# Patient Record
Sex: MALE | Race: WHITE | NOT HISPANIC OR LATINO | Employment: UNEMPLOYED | URBAN - METROPOLITAN AREA
[De-identification: names, ages, dates, MRNs, and addresses within clinical notes are randomized per-mention and may not be internally consistent; named-entity substitution may affect disease eponyms.]

---

## 2022-11-14 ENCOUNTER — DOCUMENTATION (OUTPATIENT)
Dept: SURGERY | Facility: CLINIC | Age: 27
End: 2022-11-14

## 2022-11-14 NOTE — PROGRESS NOTES
CM reached out to client over the phone to confirm his 11/15/22 intake assessment office appointment and client did not answered to CM verification phone call. CM will continue supporting client case management services.   CM sent out a welcome letter to client.

## 2022-11-15 DIAGNOSIS — Z13.1 SCREENING FOR DIABETES MELLITUS: ICD-10-CM

## 2022-11-15 DIAGNOSIS — B20 HIV DISEASE (HCC): Primary | ICD-10-CM

## 2022-11-15 DIAGNOSIS — Z72.89 OTHER PROBLEMS RELATED TO LIFESTYLE: ICD-10-CM

## 2022-11-15 DIAGNOSIS — Z11.1 SCREENING-PULMONARY TB: ICD-10-CM

## 2022-11-15 DIAGNOSIS — Z13.220 ENCOUNTER FOR LIPID SCREENING FOR CARDIOVASCULAR DISEASE: ICD-10-CM

## 2022-11-15 DIAGNOSIS — Z20.2 CONTACT WITH AND (SUSPECTED) EXPOSURE TO INFECTIONS WITH A PREDOMINANTLY SEXUAL MODE OF TRANSMISSION: ICD-10-CM

## 2022-11-15 DIAGNOSIS — Z11.59 ENCOUNTER FOR SCREENING FOR OTHER VIRAL DISEASES: ICD-10-CM

## 2022-11-15 DIAGNOSIS — Z01.84 IMMUNITY STATUS TESTING: ICD-10-CM

## 2022-11-15 DIAGNOSIS — Z11.3 ROUTINE SCREENING FOR STI (SEXUALLY TRANSMITTED INFECTION): ICD-10-CM

## 2022-11-15 DIAGNOSIS — Z13.6 ENCOUNTER FOR LIPID SCREENING FOR CARDIOVASCULAR DISEASE: ICD-10-CM

## 2022-11-16 LAB
ALBUMIN SERPL BCP-MCNC: 3.9 G/DL (ref 3.5–5)
ALP SERPL-CCNC: 60 U/L (ref 46–116)
ALT SERPL W P-5'-P-CCNC: 21 U/L (ref 12–78)
ANION GAP SERPL CALCULATED.3IONS-SCNC: 6 MMOL/L (ref 4–13)
AST SERPL W P-5'-P-CCNC: 13 U/L (ref 5–45)
BACTERIA UR QL AUTO: ABNORMAL /HPF
BASOPHILS # BLD AUTO: 0.01 THOUSANDS/ÂΜL (ref 0–0.1)
BASOPHILS NFR BLD AUTO: 0 % (ref 0–1)
BILIRUB SERPL-MCNC: 1.74 MG/DL (ref 0.2–1)
BILIRUB UR QL STRIP: NEGATIVE
BUN SERPL-MCNC: 14 MG/DL (ref 5–25)
CALCIUM SERPL-MCNC: 9.5 MG/DL (ref 8.3–10.1)
CHLORIDE SERPL-SCNC: 110 MMOL/L (ref 96–108)
CHOLEST SERPL-MCNC: 152 MG/DL
CLARITY UR: ABNORMAL
CO2 SERPL-SCNC: 23 MMOL/L (ref 21–32)
COLOR UR: ABNORMAL
CREAT SERPL-MCNC: 1.06 MG/DL (ref 0.6–1.3)
EOSINOPHIL # BLD AUTO: 0.04 THOUSAND/ÂΜL (ref 0–0.61)
EOSINOPHIL NFR BLD AUTO: 1 % (ref 0–6)
ERYTHROCYTE [DISTWIDTH] IN BLOOD BY AUTOMATED COUNT: 12.8 % (ref 11.6–15.1)
GFR SERPL CREATININE-BSD FRML MDRD: 95 ML/MIN/1.73SQ M
GLUCOSE P FAST SERPL-MCNC: 95 MG/DL (ref 65–99)
GLUCOSE UR STRIP-MCNC: NEGATIVE MG/DL
HCT VFR BLD AUTO: 45.6 % (ref 36.5–49.3)
HDLC SERPL-MCNC: 34 MG/DL
HGB BLD-MCNC: 15.5 G/DL (ref 12–17)
HGB UR QL STRIP.AUTO: NEGATIVE
IMM GRANULOCYTES # BLD AUTO: 0.01 THOUSAND/UL (ref 0–0.2)
IMM GRANULOCYTES NFR BLD AUTO: 0 % (ref 0–2)
KETONES UR STRIP-MCNC: NEGATIVE MG/DL
LDLC SERPL CALC-MCNC: 98 MG/DL (ref 0–100)
LEUKOCYTE ESTERASE UR QL STRIP: NEGATIVE
LYMPHOCYTES # BLD AUTO: 2.06 THOUSANDS/ÂΜL (ref 0.6–4.47)
LYMPHOCYTES NFR BLD AUTO: 42 % (ref 14–44)
MCH RBC QN AUTO: 31.4 PG (ref 26.8–34.3)
MCHC RBC AUTO-ENTMCNC: 34 G/DL (ref 31.4–37.4)
MCV RBC AUTO: 93 FL (ref 82–98)
MONOCYTES # BLD AUTO: 0.4 THOUSAND/ÂΜL (ref 0.17–1.22)
MONOCYTES NFR BLD AUTO: 8 % (ref 4–12)
MUCOUS THREADS UR QL AUTO: ABNORMAL
NEUTROPHILS # BLD AUTO: 2.44 THOUSANDS/ÂΜL (ref 1.85–7.62)
NEUTS SEG NFR BLD AUTO: 49 % (ref 43–75)
NITRITE UR QL STRIP: NEGATIVE
NON-SQ EPI CELLS URNS QL MICRO: ABNORMAL /HPF
NRBC BLD AUTO-RTO: 0 /100 WBCS
PH UR STRIP.AUTO: 5.5 [PH]
PLATELET # BLD AUTO: 235 THOUSANDS/UL (ref 149–390)
PMV BLD AUTO: 10.7 FL (ref 8.9–12.7)
POTASSIUM SERPL-SCNC: 3.9 MMOL/L (ref 3.5–5.3)
PROT SERPL-MCNC: 7.9 G/DL (ref 6.4–8.4)
PROT UR STRIP-MCNC: ABNORMAL MG/DL
RBC # BLD AUTO: 4.93 MILLION/UL (ref 3.88–5.62)
RBC #/AREA URNS AUTO: ABNORMAL /HPF
SODIUM SERPL-SCNC: 139 MMOL/L (ref 135–147)
SP GR UR STRIP.AUTO: 1.02 (ref 1–1.03)
TRIGL SERPL-MCNC: 98 MG/DL
UROBILINOGEN UR STRIP-ACNC: <2 MG/DL
WBC # BLD AUTO: 4.96 THOUSAND/UL (ref 4.31–10.16)
WBC #/AREA URNS AUTO: ABNORMAL /HPF

## 2022-11-16 PROCEDURE — 86803 HEPATITIS C AB TEST: CPT

## 2022-11-16 PROCEDURE — 86708 HEPATITIS A ANTIBODY: CPT

## 2022-11-16 PROCEDURE — 36415 COLL VENOUS BLD VENIPUNCTURE: CPT

## 2022-11-16 PROCEDURE — 86645 CMV ANTIBODY IGM: CPT

## 2022-11-16 PROCEDURE — 80053 COMPREHEN METABOLIC PANEL: CPT

## 2022-11-16 PROCEDURE — 86778 TOXOPLASMA ANTIBODY IGM: CPT

## 2022-11-16 PROCEDURE — 81001 URINALYSIS AUTO W/SCOPE: CPT

## 2022-11-16 PROCEDURE — 87591 N.GONORRHOEAE DNA AMP PROB: CPT

## 2022-11-16 PROCEDURE — 87491 CHLMYD TRACH DNA AMP PROBE: CPT

## 2022-11-16 PROCEDURE — 86706 HEP B SURFACE ANTIBODY: CPT

## 2022-11-16 PROCEDURE — 87340 HEPATITIS B SURFACE AG IA: CPT

## 2022-11-16 PROCEDURE — 81381 HLA I TYPING 1 ALLELE HR: CPT

## 2022-11-16 PROCEDURE — 86592 SYPHILIS TEST NON-TREP QUAL: CPT

## 2022-11-16 PROCEDURE — 86644 CMV ANTIBODY: CPT

## 2022-11-16 PROCEDURE — 83036 HEMOGLOBIN GLYCOSYLATED A1C: CPT

## 2022-11-16 PROCEDURE — 86777 TOXOPLASMA ANTIBODY: CPT

## 2022-11-16 PROCEDURE — 86361 T CELL ABSOLUTE COUNT: CPT

## 2022-11-16 PROCEDURE — 85025 COMPLETE CBC W/AUTO DIFF WBC: CPT

## 2022-11-16 PROCEDURE — 80061 LIPID PANEL: CPT

## 2022-11-16 PROCEDURE — 86480 TB TEST CELL IMMUN MEASURE: CPT

## 2022-11-17 LAB
BASOPHILS # BLD AUTO: 0 X10E3/UL (ref 0–0.2)
BASOPHILS NFR BLD AUTO: 0 %
C TRACH DNA SPEC QL NAA+PROBE: NEGATIVE
CD3+CD4+ CELLS # BLD: 1058 /UL (ref 359–1519)
CD3+CD4+ CELLS NFR BLD: 46 % (ref 30.8–58.5)
CLINICAL COMMENT: NORMAL
CMV IGG SERPL IA-ACNC: 1.4 U/ML (ref 0–0.59)
CMV IGM SERPL IA-ACNC: <30 AU/ML (ref 0–29.9)
EOSINOPHIL # BLD AUTO: 0.1 X10E3/UL (ref 0–0.4)
EOSINOPHIL NFR BLD AUTO: 1 %
ERYTHROCYTE [DISTWIDTH] IN BLOOD BY AUTOMATED COUNT: 13.1 % (ref 11.6–15.4)
EST. AVERAGE GLUCOSE BLD GHB EST-MCNC: 100 MG/DL
HAV AB SER QL IA: REACTIVE
HBA1C MFR BLD: 5.1 %
HBV SURFACE AB SER-ACNC: >1000 MIU/ML
HBV SURFACE AG SER QL: NORMAL
HCT VFR BLD AUTO: 40.4 % (ref 37.5–51)
HCV AB SER QL: NORMAL
HGB BLD-MCNC: 13.9 G/DL (ref 13–17.7)
IMM GRANULOCYTES # BLD: 0 X10E3/UL (ref 0–0.1)
IMM GRANULOCYTES NFR BLD: 0 %
LYMPHOCYTES # BLD AUTO: 2.3 X10E3/UL (ref 0.7–3.1)
LYMPHOCYTES NFR BLD AUTO: 41 %
MCH RBC QN AUTO: 31.4 PG (ref 26.6–33)
MCHC RBC AUTO-ENTMCNC: 34.4 G/DL (ref 31.5–35.7)
MCV RBC AUTO: 91 FL (ref 79–97)
MONOCYTES # BLD AUTO: 0.5 X10E3/UL (ref 0.1–0.9)
MONOCYTES NFR BLD AUTO: 8 %
N GONORRHOEA DNA SPEC QL NAA+PROBE: NEGATIVE
NEUTROPHILS # BLD AUTO: 2.7 X10E3/UL (ref 1.4–7)
NEUTROPHILS NFR BLD AUTO: 50 %
PLATELET # BLD AUTO: 211 X10E3/UL (ref 150–450)
RBC # BLD AUTO: 4.43 X10E6/UL (ref 4.14–5.8)
RPR SER QL: NORMAL
T GONDII IGG SERPL IA-ACNC: <3 IU/ML (ref 0–7.1)
T GONDII IGM SER IA-ACNC: <3 AU/ML (ref 0–7.9)
WBC # BLD AUTO: 5.5 X10E3/UL (ref 3.4–10.8)

## 2022-11-18 LAB
GAMMA INTERFERON BACKGROUND BLD IA-ACNC: 0.06 IU/ML
M TB IFN-G BLD-IMP: NEGATIVE
M TB IFN-G CD4+ BCKGRND COR BLD-ACNC: 0.01 IU/ML
M TB IFN-G CD4+ BCKGRND COR BLD-ACNC: 0.02 IU/ML
MITOGEN IGNF BCKGRD COR BLD-ACNC: >10 IU/ML

## 2022-11-23 LAB — HLA-B*57:01 SPEC QL: NEGATIVE

## 2022-11-28 NOTE — PROGRESS NOTES
APOORVA received a phone call from Mrs. Pittman requesting verification on client current CM services status, which is active.

## 2022-11-29 PROBLEM — B20 CURRENTLY ASYMPTOMATIC HIV INFECTION, WITH HISTORY OF HIV-RELATED ILLNESS (HCC): Status: ACTIVE | Noted: 2022-11-29

## 2022-12-01 ENCOUNTER — DOCUMENTATION (OUTPATIENT)
Dept: SURGERY | Facility: CLINIC | Age: 27
End: 2022-12-01

## 2022-12-01 DIAGNOSIS — B20 CURRENTLY ASYMPTOMATIC HIV INFECTION, WITH HISTORY OF HIV-RELATED ILLNESS (HCC): Primary | ICD-10-CM

## 2022-12-01 PROBLEM — E80.4 GILBERT'S SYNDROME: Status: ACTIVE | Noted: 2022-12-01

## 2022-12-01 NOTE — PROGRESS NOTES
CM reached out to Dr. Kristine Mason over the phone at [phone] to schedule client a general dentist visit, and left a voice message explaining the purposes of the phone call. Dr. Mason is in under [employer] network, and the office is located at [address] [place] [address], near client's home. CM will continue supporting client wellness care plan.

## 2022-12-01 NOTE — PROGRESS NOTES
CM received a notification from Mrs. Garber stating that client was asking for dentist care. CM collaborated client request and ct. Agreed to get a dentist referral as part of his wellness care plan. CM will be supporting client dentist care referral.

## 2022-12-01 NOTE — PROGRESS NOTES
Called and spoke with pt about changing ART to [facility].      · Discussed side effects  · Order sent to [facility]  · Labs in 4 weeks  · Establish care/f/u visit in 6 weeks with ID  · Reviewed labs: elevated direct bili: suspect Gilbert's syndrome and UA: bland  · Discussed with Dr. Daniele Pan  · Pt verbalized understanding with plan of care

## 2023-03-06 PROBLEM — R17 SERUM TOTAL BILIRUBIN ELEVATED: Status: RESOLVED | Noted: 2022-11-29 | Resolved: 2023-03-06

## 2023-03-07 ENCOUNTER — PATIENT OUTREACH (OUTPATIENT)
Dept: SURGERY | Facility: CLINIC | Age: 28
End: 2023-03-07

## 2023-03-07 NOTE — PROGRESS NOTES
The Client e-mailed CM requesting assistance with bills and appointments  CM called and left a voice message asking him to contact CM back

## 2023-03-08 ENCOUNTER — PATIENT OUTREACH (OUTPATIENT)
Dept: SURGERY | Facility: CLINIC | Age: 28
End: 2023-03-08

## 2023-03-09 ENCOUNTER — PATIENT OUTREACH (OUTPATIENT)
Dept: SURGERY | Facility: CLINIC | Age: 28
End: 2023-03-09

## 2023-03-09 NOTE — PROGRESS NOTES
The Client will receive a Wellness and Physical therapy care invitation to his home mailing address to support his wellness care

## 2023-03-10 ENCOUNTER — PATIENT OUTREACH (OUTPATIENT)
Dept: SURGERY | Facility: CLINIC | Age: 28
End: 2023-03-10

## 2023-03-10 NOTE — PROGRESS NOTES
The Client connect with CM to schedule a meeting  The meeting was scheduled for Tuesday, March 14, 2023 @10:30 a m

## 2023-03-10 NOTE — PROGRESS NOTES
The client e-mailed CM requesting assistance with his bills  CM calls him back unsuccessfully because Ct  has not responded  CM left him a voice message

## 2023-03-13 ENCOUNTER — PATIENT OUTREACH (OUTPATIENT)
Dept: SURGERY | Facility: CLINIC | Age: 28
End: 2023-03-13

## 2023-03-13 DIAGNOSIS — B20 CURRENTLY ASYMPTOMATIC HIV INFECTION, WITH HISTORY OF HIV-RELATED ILLNESS (HCC): ICD-10-CM

## 2023-03-13 NOTE — PROGRESS NOTES
The client has reached out to CM via a phone call requesting bills and appointment assistance  CM explained to the client that the medical care bills needed to be brought to the office to assist with his billing and medical appointment issues  The client agreed to meet up with CM at the office at 10:30, client's agreed  CM will be linking Ct back to medical care

## 2023-03-14 ENCOUNTER — PATIENT OUTREACH (OUTPATIENT)
Dept: SURGERY | Facility: CLINIC | Age: 28
End: 2023-03-14

## 2023-03-14 RX ORDER — BICTEGRAVIR SODIUM, EMTRICITABINE, AND TENOFOVIR ALAFENAMIDE FUMARATE 50; 200; 25 MG/1; MG/1; MG/1
1 TABLET ORAL
Qty: 90 TABLET | Refills: 0 | Status: SHIPPED | OUTPATIENT
Start: 2023-03-14

## 2023-03-14 NOTE — PROGRESS NOTES
CM reached out to Ct  to re-schedule him, and the Client said he thought it was for  Wednesday, 3/15/2023  Therefore, the Ct  has been rescheduled for  3/15/23 at 10:30 a m  CM will verify this meeting with Ct  Tomorrow 3/14/23

## 2023-03-15 ENCOUNTER — PATIENT OUTREACH (OUTPATIENT)
Dept: SURGERY | Facility: CLINIC | Age: 28
End: 2023-03-15

## 2023-03-15 NOTE — PROGRESS NOTES
CM connected the client via text message as a reminder of his CM meeting today, and Ct  confirmed it  The Client met with CM and disclosed that he is moving out of [place] to [facility] and would like to receive the same quality care he is and has received from [address]  CM explained to the Client that he would be transitioning and linked to ongoing health care service  The Client also requested assistance with the high-cost co-payments he has been receiving from his Medical Insurance  CM will be contacting [facility]'s billing department because the client RW Authorization, and M D C  Holdings cover him  The Client agreed to continue receiving Medical Care, [address] and ongoing  from [facility]

## 2023-03-17 ENCOUNTER — PATIENT OUTREACH (OUTPATIENT)
Dept: SURGERY | Facility: CLINIC | Age: 28
End: 2023-03-17

## 2023-03-17 NOTE — PROGRESS NOTES
CM contacted the Department of Financial Counselor, Mrs Thierry Caban, at [phone] to discuss Ct 's copayment medical bill  Mrs Thierry Caban explained to CM that due to Ct 's over income, he does not qualify under RW authorization for this bill of February 16, 2023, for $249 62  Mrs Thierry Caban told Cm that she would send a "Hardship" application for Ct  CM will continue supporting the client’s well-being

## 2023-03-20 ENCOUNTER — PATIENT OUTREACH (OUTPATIENT)
Dept: SURGERY | Facility: CLINIC | Age: 28
End: 2023-03-20

## 2023-03-21 ENCOUNTER — PATIENT OUTREACH (OUTPATIENT)
Dept: SURGERY | Facility: CLINIC | Age: 28
End: 2023-03-21

## 2023-03-21 NOTE — PROGRESS NOTES
APOORVA contacted the client to arrange an office meeting to assist him with his medical co-payment bills by completing his HARDSHIP application  The Client agreed to meet with APOORVA on 3/28/2023 at 10:00 a m  The Client has mentioned to  that he is in the process of moving to [place] for a [employer] job   Therefore, the Client will transition his ongoing medical care to [facility]  or [facility]

## 2023-03-21 NOTE — PROGRESS NOTES
The Client has a Co-payment bill of $249 62, and the RW scale approval does not cover ct 's co-payment due to his spouse’s High Income--Ct’s dependent  Therefore, CM will assist Ct  in completing a Hard Ship application

## 2023-03-22 ENCOUNTER — PATIENT OUTREACH (OUTPATIENT)
Dept: SURGERY | Facility: CLINIC | Age: 28
End: 2023-03-22

## 2023-03-22 NOTE — PROGRESS NOTES
APOORVA sent out to the client's home mail address a Reward Program notice to motivate the client to continue proactive with all preventive care plan services as part of his ongoing well-being

## 2023-03-28 ENCOUNTER — PATIENT OUTREACH (OUTPATIENT)
Dept: SURGERY | Facility: CLINIC | Age: 28
End: 2023-03-28

## 2023-03-28 ENCOUNTER — OFFICE VISIT (OUTPATIENT)
Dept: SURGERY | Facility: CLINIC | Age: 28
End: 2023-03-28

## 2023-03-28 ENCOUNTER — APPOINTMENT (OUTPATIENT)
Dept: LAB | Facility: CLINIC | Age: 28
End: 2023-03-28

## 2023-03-28 VITALS
HEART RATE: 77 BPM | WEIGHT: 220.6 LBS | OXYGEN SATURATION: 99 % | BODY MASS INDEX: 28.31 KG/M2 | HEIGHT: 74 IN | TEMPERATURE: 97.2 F | RESPIRATION RATE: 17 BRPM | DIASTOLIC BLOOD PRESSURE: 63 MMHG | SYSTOLIC BLOOD PRESSURE: 135 MMHG

## 2023-03-28 DIAGNOSIS — Z23 NEED FOR PNEUMOCOCCAL VACCINATION: ICD-10-CM

## 2023-03-28 DIAGNOSIS — B20 HIV DISEASE (HCC): ICD-10-CM

## 2023-03-28 DIAGNOSIS — Z23 NEED FOR TDAP VACCINATION: ICD-10-CM

## 2023-03-28 DIAGNOSIS — Z02.4 DRIVER'S PERMIT PE (PHYSICAL EXAMINATION): ICD-10-CM

## 2023-03-28 DIAGNOSIS — E80.4 GILBERT'S SYNDROME: ICD-10-CM

## 2023-03-28 DIAGNOSIS — R80.9 PROTEINURIA, UNSPECIFIED TYPE: ICD-10-CM

## 2023-03-28 DIAGNOSIS — B20 CURRENTLY ASYMPTOMATIC HIV INFECTION, WITH HISTORY OF HIV-RELATED ILLNESS (HCC): Primary | ICD-10-CM

## 2023-03-28 LAB
ALBUMIN SERPL BCP-MCNC: 4.1 G/DL (ref 3.5–5)
ALP SERPL-CCNC: 49 U/L (ref 46–116)
ALT SERPL W P-5'-P-CCNC: 21 U/L (ref 12–78)
ANION GAP SERPL CALCULATED.3IONS-SCNC: 3 MMOL/L (ref 4–13)
AST SERPL W P-5'-P-CCNC: 16 U/L (ref 5–45)
BASOPHILS # BLD AUTO: 0.01 THOUSANDS/ÂΜL (ref 0–0.1)
BASOPHILS NFR BLD AUTO: 0 % (ref 0–1)
BILIRUB SERPL-MCNC: 1.43 MG/DL (ref 0.2–1)
BUN SERPL-MCNC: 13 MG/DL (ref 5–25)
CALCIUM SERPL-MCNC: 9.2 MG/DL (ref 8.3–10.1)
CHLORIDE SERPL-SCNC: 108 MMOL/L (ref 96–108)
CO2 SERPL-SCNC: 26 MMOL/L (ref 21–32)
CREAT SERPL-MCNC: 0.94 MG/DL (ref 0.6–1.3)
EOSINOPHIL # BLD AUTO: 0.07 THOUSAND/ÂΜL (ref 0–0.61)
EOSINOPHIL NFR BLD AUTO: 1 % (ref 0–6)
ERYTHROCYTE [DISTWIDTH] IN BLOOD BY AUTOMATED COUNT: 12.2 % (ref 11.6–15.1)
GFR SERPL CREATININE-BSD FRML MDRD: 110 ML/MIN/1.73SQ M
GLUCOSE P FAST SERPL-MCNC: 90 MG/DL (ref 65–99)
HCT VFR BLD AUTO: 47.7 % (ref 36.5–49.3)
HGB BLD-MCNC: 15.8 G/DL (ref 12–17)
IMM GRANULOCYTES # BLD AUTO: 0.01 THOUSAND/UL (ref 0–0.2)
IMM GRANULOCYTES NFR BLD AUTO: 0 % (ref 0–2)
LYMPHOCYTES # BLD AUTO: 1.74 THOUSANDS/ÂΜL (ref 0.6–4.47)
LYMPHOCYTES NFR BLD AUTO: 36 % (ref 14–44)
MCH RBC QN AUTO: 30.4 PG (ref 26.8–34.3)
MCHC RBC AUTO-ENTMCNC: 33.1 G/DL (ref 31.4–37.4)
MCV RBC AUTO: 92 FL (ref 82–98)
MONOCYTES # BLD AUTO: 0.35 THOUSAND/ÂΜL (ref 0.17–1.22)
MONOCYTES NFR BLD AUTO: 7 % (ref 4–12)
NEUTROPHILS # BLD AUTO: 2.69 THOUSANDS/ÂΜL (ref 1.85–7.62)
NEUTS SEG NFR BLD AUTO: 56 % (ref 43–75)
NRBC BLD AUTO-RTO: 0 /100 WBCS
PLATELET # BLD AUTO: 223 THOUSANDS/UL (ref 149–390)
PMV BLD AUTO: 10.7 FL (ref 8.9–12.7)
POTASSIUM SERPL-SCNC: 3.8 MMOL/L (ref 3.5–5.3)
PROT SERPL-MCNC: 7.6 G/DL (ref 6.4–8.4)
RBC # BLD AUTO: 5.2 MILLION/UL (ref 3.88–5.62)
SODIUM SERPL-SCNC: 137 MMOL/L (ref 135–147)
WBC # BLD AUTO: 4.87 THOUSAND/UL (ref 4.31–10.16)

## 2023-03-28 NOTE — ASSESSMENT & PLAN NOTE
Gained 4 lb since last visit     BMI: 28 32; WT: 220 lb  · Continue to work out at gym  · Recommend portion control

## 2023-03-28 NOTE — ASSESSMENT & PLAN NOTE
Doing well on Biktarvy with an undetectable VL  Pt reports 100% medication compliance on HAART  · Stressed the importance of 100% medication adherence    HIV Counseling:    Viral Load: < 20    CD4 Count: 0000      Denies side effects  Stressed the importance of adherence  Continue follow up in the ID clinic with Dr Delon Dutton or Dr Bee Thorne  Reviewed the most recent labs, including the CD4 and viral load  Discussed the risks and benefits of treatment options, instructions for management, importance of treatment adherence, and reduction of risk factors  Educated on possible medication side effects  Counseled on routes of HIV transmission, including the risk of  infection  Emphasized that viral suppression is the best method to prevent HIV transmission  At this time the pt denies the need for HIV testing of anyone in their life  Total encounter time was greater than 35 minutes  Greater than 20 minutes were spent on counseling and patient education  Pt voices understanding and agreement with treatment plan

## 2023-03-28 NOTE — ASSESSMENT & PLAN NOTE
Feeling well  Last UA bland;no protein  BP well controlled    · Continue to monitor BP  · Continue to monitor UA

## 2023-03-28 NOTE — PATIENT INSTRUCTIONS
Heart Healthy Diet   AMBULATORY CARE:   A heart healthy diet  is an eating plan low in unhealthy fats and sodium (salt)  The plan is high in healthy fats and fiber  A heart healthy diet helps improve your cholesterol levels and lowers your risk for heart disease and stroke  A dietitian will teach you how to read and understand food labels  Heart healthy diet guidelines to follow:   • Choose foods that contain healthy fats:      ? Unsaturated fats  include monounsaturated and polyunsaturated fats  Unsaturated fat is found in foods such as soybean, canola, olive, corn, and safflower oils  It is also found in soft tub margarine that is made with liquid vegetable oil  ? Omega-3 fat  is found in certain fish, such as salmon, tuna, and trout, and in walnuts and flaxseed  Eat fish high in omega-3 fats at least 2 times a week  • Limit or do not have unhealthy fats:      ? Cholesterol  is found in animal foods, such as eggs and lobster, and in dairy products made from whole milk  Limit cholesterol to less than 200 mg each day  ? Saturated fat  is found in meats, such as varghese and hamburger  It is also found in chicken or turkey skin, whole milk, and butter  Limit saturated fat to less than 7% of your total daily calories  ? Trans fat  is found in packaged foods, such as potato chips and cookies  It is also in hard margarine, some fried foods, and shortening  Do not eat foods that contain trans fats  • Get 20 to 30 grams of fiber each day  Fruits, vegetables, whole-grain foods, and legumes (cooked beans) are good sources of fiber  • Limit sodium as directed  You may be told to limit sodium, such as to 2,000 mg or less each day  Choose low-sodium or no-salt-added foods  Add little or no salt to food you prepare  Use herbs and spices in place of salt         Include the following in your heart healthy plan:  Ask your dietitian or healthcare provider how many servings to have each day from the following food groups:  • Grains:      ? Whole-wheat breads, cereals, and pastas, and brown rice    ? Low-fat, low-sodium crackers and chips    • Vegetables:      ? Broccoli, green beans, green peas, and spinach    ? Collards, kale, and lima beans    ? Carrots, sweet potatoes, tomatoes, and peppers    ? Canned vegetables with no salt added    • Fruits:      ? Bananas, peaches, pears, and pineapple    ? Grapes, raisins, and dates    ? Oranges, tangerines, grapefruit, orange juice, and grapefruit juice    ? Apricots, mangoes, melons, and papaya    ? Raspberries and strawberries    ? Canned fruit with no added sugar    • Low-fat dairy:      ? Nonfat (skim) milk, 1% milk, and low-fat almond, cashew, or soy milks fortified with calcium    ? Low-fat cheese, regular or frozen yogurt, and cottage cheese    • Meats and proteins:      ? Lean cuts of beef and pork (loin, leg, round), skinless chicken and turkey    ? Legumes, soy products, egg whites, or nuts    Limit or do not include the following in your heart healthy plan:   • Foods and liquids that contain unhealthy fats and oils:      ? Whole or 2% milk, cream cheese, sour cream, or cheese    ? High-fat cuts of beef (T-bone steaks, ribs), chicken or turkey with skin, and organ meats such as liver    ? Butter, stick margarine, shortening, and cooking oils such as coconut or palm oil    • Foods and liquids high in sodium:      ? Packaged foods, such as frozen dinners, cookies, macaroni and cheese, and cereals with more than 300 mg of sodium per serving    ? Vegetables with added sodium, such as instant potatoes, vegetables with added sauces, or regular canned vegetables    ? Cured or smoked meats, such as hot dogs, varghese, and sausage    ? High-sodium ketchup, barbecue sauce, salad dressing, pickles, olives, soy sauce, or miso    • Foods and liquids high in sugar:      ? Candy, cake, cookies, pies, or doughnuts    ?  Soft drinks (soda), sports drinks, or sweetened tea    ? Canned or dry mixes for cakes, soups, sauces, or gravies    Other healthy heart guidelines:   • Do not smoke  Nicotine and other chemicals in cigarettes and cigars can cause lung and heart damage  Ask your healthcare provider for information if you currently smoke and need help to quit  E-cigarettes or smokeless tobacco still contain nicotine  Talk to your provider before you use these products  • Limit or do not drink alcohol as directed  Alcohol can damage your heart and raise your blood pressure  Your healthcare provider may give you specific daily and weekly limits  The general recommended limit is 1 drink a day for women 21 or older and for men 72 or older  Do not have more than 3 drinks within 24 hours or 7 within a week  The recommended limit is 2 drinks a day for men 24to 59years of age  Do not have more than 4 drinks within 24 hours or 14 within a week  A drink of alcohol is 12 ounces of beer, 5 ounces of wine, or 1½ ounces of liquor  • Maintain a healthy weight  Extra body weight makes your heart work harder  Ask your provider what a healthy weight is for you  He or she can help you create a safe weight loss plan, if needed  • Exercise regularly  Exercise can help you maintain a healthy weight and improve your blood pressure and cholesterol levels  Regular exercise can also decrease your risk for heart problems  Ask your provider about the best exercise plan for you  Do not start an exercise program without asking your provider  Follow up with your doctor or cardiologist as directed:  Write down your questions so you remember to ask them during your visits  © Copyright [employer] 2022 Information is for End User's use only and may not be sold, redistributed or otherwise used for commercial purposes  The above information is an  only  It is not intended as medical advice for individual conditions or treatments   Talk to your doctor, nurse or pharmacist before following any medical regimen to see if it is safe and effective for you

## 2023-03-28 NOTE — PROGRESS NOTES
Assessment/Plan:    Currently asymptomatic HIV infection, with history of HIV-related illness ([facility] )  Doing well on Biktarvy with an undetectable VL  Pt reports 100% medication compliance on HAART  · Stressed the importance of 100% medication adherence    HIV Counseling:    Viral Load: < 20    CD4 Count: 6334      Denies side effects  Stressed the importance of adherence  Continue follow up in the ID clinic with Dr Whitley Crocker or Dr Janell Mcdonough  Reviewed the most recent labs, including the CD4 and viral load  Discussed the risks and benefits of treatment options, instructions for management, importance of treatment adherence, and reduction of risk factors  Educated on possible medication side effects  Counseled on routes of HIV transmission, including the risk of  infection  Emphasized that viral suppression is the best method to prevent HIV transmission  At this time the pt denies the need for HIV testing of anyone in their life  Total encounter time was greater than 35 minutes  Greater than 20 minutes were spent on counseling and patient education  Pt voices understanding and agreement with treatment plan  Gilbert's syndrome  Feeling well  T  BIli: 1 81   Known hx of Gilbert's syndrome  · No need for further workup  · Continue to monitor CMP    Proteinuria  Feeling well  Last UA bland;no protein  BP well controlled  · Continue to monitor BP  · Continue to monitor UA     BMI 28 0-28 9,adult  Gained 4 lb since last visit     BMI: 28 32; WT: 220 lb  · Continue to work out at gym  · Recommend portion control       Diagnoses and all orders for this visit:    Currently asymptomatic HIV infection, with history of HIV-related illness ([facility] )    Need for Tdap vaccination  -     TDAP VACCINE GREATER THAN OR EQUAL TO 6YO IM    Need for pneumococcal vaccination  -     Pneumococcal Conjugate Vaccine 20-valent (Pcv20)    Gilbert's syndrome    Proteinuria, unspecified type    's permit PE (physical "examination)    BMI 28 0-28 9,adult        Lab Results   Component Value Date    K 3 8 01/03/2023     01/03/2023    CO2 29 01/03/2023    BUN 11 01/03/2023    CREATININE 0 99 01/03/2023    GLUF 93 01/03/2023    CALCIUM 9 3 01/03/2023    AST 12 01/03/2023    ALT 20 01/03/2023    ALKPHOS 55 01/03/2023    EGFR 103 01/03/2023     Lab Results   Component Value Date    WBC 4 96 01/03/2023    HGB 15 4 01/03/2023    HCT 47 0 01/03/2023    MCV 95 01/03/2023     01/03/2023          Subjective:      Patient ID: Valencia Parsons  is a 32 y o  male  HPI  Pt presents for 3 month follow up for [employer] and management of HIV  Pt was changed to [place] after moving to [place] from [facility] and tolerating well  Pt is doing great  Pt got his ID and has been traveling the [address] [facility]  Pt is planning on getting a job  Pt is applying for [employer] permit and needs a physical          Pt reports 3 days of HA's last week while working on the computer and on the [employer] and that relieved his HA  This is the medication OTC medication he would take in [facility] for pain relief  Pt is not sure if it was due to working on the computer but no further HA's  Pt does not endorse any fever, chills, nausea, vomiting, diarrhea, or night sweats  The following portions of the patient's history were reviewed and updated as appropriate: allergies, current medications, past family history, past medical history, past social history and problem list     Review of Systems   Constitutional: Negative  HENT: Negative  Respiratory: Negative  Cardiovascular: Negative  Gastrointestinal: Negative  Genitourinary: Negative  Musculoskeletal: Negative  Neurological: Negative  Psychiatric/Behavioral: Negative            Objective:      /63 (BP Location: Right arm, Patient Position: Sitting, Cuff Size: Large)   Pulse 77   Temp (!) 97 2 °F (36 2 °C) (Tympanic)   Resp 17   Ht 6' 2\" (1 88 m)   Wt 100 kg (220 lb 9 6 " oz)   SpO2 99%   BMI 28 32 kg/m²          Physical Exam  Vitals and nursing note reviewed  Constitutional:       General: He is not in acute distress  Appearance: Normal appearance  He is not ill-appearing  HENT:      Head: Normocephalic  Right Ear: Tympanic membrane normal       Left Ear: Tympanic membrane normal       Nose: Nose normal       Mouth/Throat:      Mouth: Mucous membranes are moist       Pharynx: Oropharynx is clear  Eyes:      Extraocular Movements: Extraocular movements intact  Pupils: Pupils are equal, round, and reactive to light  Cardiovascular:      Rate and Rhythm: Normal rate and regular rhythm  Chest Wall: PMI is not displaced  Pulses: Normal pulses  Heart sounds: Normal heart sounds  Pulmonary:      Effort: Pulmonary effort is normal       Breath sounds: Normal breath sounds  Abdominal:      General: Bowel sounds are normal       Palpations: Abdomen is soft  Musculoskeletal:         General: Normal range of motion  Lymphadenopathy:      Cervical: No cervical adenopathy  Skin:     General: Skin is warm and dry  Capillary Refill: Capillary refill takes less than 2 seconds  Neurological:      Mental Status: He is alert and oriented to person, place, and time  Psychiatric:         Mood and Affect: Mood normal          Behavior: Behavior normal          Thought Content: Thought content normal          Judgment: Judgment normal        BMI Counseling: Body mass index is 28 32 kg/m²  The BMI is above normal  Nutrition recommendations include reducing portion sizes, decreasing overall calorie intake, reducing fast food intake, consuming healthier snacks, moderation in carbohydrate intake, increasing intake of lean protein, reducing intake of saturated fat and trans fat and reducing intake of cholesterol  Exercise recommendations include moderate aerobic physical activity for 150 minutes/week, exercising 3-5 times per week and joining a gym

## 2023-03-28 NOTE — ASSESSMENT & PLAN NOTE
Feeling well  T  BIli: 1 81   Known hx of Gilbert's syndrome  · No need for further workup    · Continue to monitor CMP

## 2023-03-29 LAB
BASOPHILS # BLD AUTO: 0 X10E3/UL (ref 0–0.2)
BASOPHILS NFR BLD AUTO: 0 %
CD3+CD4+ CELLS # BLD: 670 /UL (ref 359–1519)
CD3+CD4+ CELLS NFR BLD: 39.4 % (ref 30.8–58.5)
EOSINOPHIL # BLD AUTO: 0.1 X10E3/UL (ref 0–0.4)
EOSINOPHIL NFR BLD AUTO: 1 %
ERYTHROCYTE [DISTWIDTH] IN BLOOD BY AUTOMATED COUNT: 12.2 % (ref 11.6–15.4)
HCT VFR BLD AUTO: 46.5 % (ref 37.5–51)
HGB BLD-MCNC: 15.9 G/DL (ref 13–17.7)
IMM GRANULOCYTES # BLD: 0 X10E3/UL (ref 0–0.1)
IMM GRANULOCYTES NFR BLD: 0 %
LYMPHOCYTES # BLD AUTO: 1.7 X10E3/UL (ref 0.7–3.1)
LYMPHOCYTES NFR BLD AUTO: 36 %
MCH RBC QN AUTO: 30.6 PG (ref 26.6–33)
MCHC RBC AUTO-ENTMCNC: 34.2 G/DL (ref 31.5–35.7)
MCV RBC AUTO: 90 FL (ref 79–97)
MONOCYTES # BLD AUTO: 0.3 X10E3/UL (ref 0.1–0.9)
MONOCYTES NFR BLD AUTO: 7 %
NEUTROPHILS # BLD AUTO: 2.5 X10E3/UL (ref 1.4–7)
NEUTROPHILS NFR BLD AUTO: 56 %
PLATELET # BLD AUTO: 222 X10E3/UL (ref 150–450)
RBC # BLD AUTO: 5.19 X10E6/UL (ref 4.14–5.8)
WBC # BLD AUTO: 4.6 X10E3/UL (ref 3.4–10.8)

## 2023-03-29 NOTE — PROGRESS NOTES
CM met with Ct to discuss his CA-sb-dcwddad  CM informed Ct about his input to his co-payment due to the Client's Spouse’s High Income  The Client has acknowledged his co-payment but refuses to pay it  The Client also disclosed that he is moving to [place] and denies being at school or having any other source of income  CM informed Ct ’s ‘HARDSHIP’ application will be pending until he submits his spouse’s recent three last month’s pay stubs, ID, household bills or the apartment lease, and three rent payment receipts  The Client agreed to bring it and leave it to the [facility] desk team to be provided to CM  CM will continue supporting Ct 's well-being

## 2023-03-30 LAB
HIV1 RNA # SERPL NAA+PROBE: <20 COPIES/ML
HIV1 RNA SERPL NAA+PROBE-LOG#: NORMAL LOG10COPY/ML

## 2023-04-03 ENCOUNTER — PATIENT OUTREACH (OUTPATIENT)
Dept: SURGERY | Facility: CLINIC | Age: 28
End: 2023-04-03

## 2023-04-03 NOTE — PROGRESS NOTES
Cm contacted Ct to remind him his documentation requires to complete the HARDSHIP application  The Ct  Disclosed that he is reuniting the requested documentation  Cm will continue to support Ct 's well-being

## 2023-04-04 ENCOUNTER — PATIENT OUTREACH (OUTPATIENT)
Dept: SURGERY | Facility: CLINIC | Age: 28
End: 2023-04-04

## 2023-04-04 NOTE — PROGRESS NOTES
Cm receives Ct 's lab work from the clinician  The Client CD4 shows that Ct is still undetectable  Cm will continue to support Ct 's well-being

## 2023-04-05 ENCOUNTER — PATIENT OUTREACH (OUTPATIENT)
Dept: SURGERY | Facility: CLINIC | Age: 28
End: 2023-04-05

## 2023-04-05 ENCOUNTER — OFFICE VISIT (OUTPATIENT)
Dept: SURGERY | Facility: CLINIC | Age: 28
End: 2023-04-05

## 2023-04-05 VITALS
BODY MASS INDEX: 28.6 KG/M2 | SYSTOLIC BLOOD PRESSURE: 140 MMHG | OXYGEN SATURATION: 98 % | HEIGHT: 74 IN | WEIGHT: 222.87 LBS | DIASTOLIC BLOOD PRESSURE: 74 MMHG | TEMPERATURE: 97.1 F | HEART RATE: 83 BPM

## 2023-04-05 DIAGNOSIS — Z23 NEED FOR MENACTRA VACCINATION: ICD-10-CM

## 2023-04-05 DIAGNOSIS — R63.5 WEIGHT GAIN: ICD-10-CM

## 2023-04-05 DIAGNOSIS — B20 HIV DISEASE (HCC): Primary | ICD-10-CM

## 2023-04-05 DIAGNOSIS — E80.4 GILBERT'S SYNDROME: ICD-10-CM

## 2023-04-05 NOTE — PROGRESS NOTES
Cm contacted and revised Ct's medical care and assessment  Additionally, cm requested from Ct the documentation required to complete the 'HARPSHIP' application to assist Ct 's RW co-payment  Cm explained to Ct that his RW co-payment (CAP) goes according to his spouse’s income which is too high ($110,000 00) according to the Table of Poverty Guidelines  Ct understood and has requested to continue with the HS  Application  The HS  application is postponed due to Ct's incomplete documentation  Cm also reminded Ct of the Epic assessment due by 5/15/2023  Cm will continue to support Ct 's well-being

## 2023-04-05 NOTE — PROGRESS NOTES
"Progress Note - Infectious Disease   Almita Sandoval  32 y o  male MRN: [id number]  Unit/Bed#:  Encounter: [id number]      Impression/Plan:  1  HIV  Doing well on Biktarvy with an undetectable viral load and a CD4 count of greater than 600  Continue ART, recheck labs in 2 months and follow-up in 3 months  Stressed adherence  2   Gabrielle Alar syndrome  Monitor LFTs  Clinically inconsequential     3   Slightly elevated blood pressure  With no clinical diagnosis of hypertension  Will monitor for now  4   Weight gain  Explained to patient that there is an association between [facility] and weight gain and he will need to watch his weight carefully with improved diet and increased exercise  Patient was provided medication, adherence and prevention education    Subjective:  Routine follow-up for HIV  Patient claims 100% adherence with Biktarvy     Patient denies any notable side effects  Overall the feeling well  The patient denies any fever chills or sweats, denies any nausea vomiting or diarrhea, denies any cough or shortness of breath  ROS:  A complete review of systems is negative other than that noted above in the subjective    Followup portions patient history reviewed and updated as: Allergies, current medications, past medical history, past social history, past surgical history, and the problem list    Objective:  Vitals:  Vitals:    04/05/23 1713   BP: 140/74   BP Location: Right arm   Patient Position: Sitting   Cuff Size: Standard   Pulse: 83   Temp: (!) 97 1 °F (36 2 °C)   TempSrc: Tympanic   SpO2: 98%   Weight: 101 kg (222 lb 13 9 oz)   Height: 6' 2\" (1 88 m)       Physical Exam:   General Appearance:  Alert, interactive, appearing well,  nontoxic, no acute distress  Neck:   Supple without lymphadenopathy, no thyromegaly or masses   Throat: Oropharynx moist without lesions      Lungs:   Clear to auscultation bilaterally; no wheezes, rhonchi or rales; respirations unlabored   Heart:  " RRR; no murmur, rub or gallop   Abdomen:   Soft, non-tender, non-distended, positive bowel sounds  Extremities: No clubbing, cyanosis or edema   Skin: No new rashes or lesions  No draining wounds noted  Labs, Imaging, & Other studies:   All pertinent labs and imaging studies were personally reviewed    Lab Results   Component Value Date    K 3 8 03/28/2023     03/28/2023    CO2 26 03/28/2023    BUN 13 03/28/2023    CREATININE 0 94 03/28/2023    GLUF 90 03/28/2023    CALCIUM 9 2 03/28/2023    AST 16 03/28/2023    ALT 21 03/28/2023    ALKPHOS 49 03/28/2023    EGFR 110 03/28/2023     Lab Results   Component Value Date    WBC 4 87 03/28/2023    WBC 4 6 03/28/2023    HGB 15 8 03/28/2023    HGB 15 9 03/28/2023    HCT 47 7 03/28/2023    HCT 46 5 03/28/2023    MCV 92 03/28/2023    MCV 90 03/28/2023     03/28/2023     03/28/2023     Lab Results   Component Value Date    HEPCAB Non-reactive 11/16/2022     Lab Results   Component Value Date    HAV Reactive (A) 11/16/2022    HEPCAB Non-reactive 11/16/2022     Lab Results   Component Value Date    RPR Non-Reactive 11/16/2022     CD4 ABS   Date/Time Value Ref Range Status   03/28/2023 10:13  359 - 1519 /uL Final     HIV-1 RNA by PCR, Qn   Date/Time Value Ref Range Status   03/28/2023 10:13 AM <20 copies/mL Final     Comment:     HIV-1 RNA not detected  The reportable range for this assay is 20 to 10,000,000  copies HIV-1 RNA/mL             Current Outpatient Medications:   •  Biktarvy -25 MG tablet, TAKE 1 TABLET BY MOUTH DAILY WITH BREAKFAST, Disp: 90 tablet, Rfl: 0

## 2023-04-06 ENCOUNTER — PATIENT OUTREACH (OUTPATIENT)
Dept: SURGERY | Facility: CLINIC | Age: 28
End: 2023-04-06

## 2023-04-06 NOTE — PROGRESS NOTES
Cm received Ct's required documentation and could complete the HARDSHIP application to assist the client with his RW CAP payments  Ct is aware of possible denial due to his spouse’s High Income  The Ct  Understood  Cm applied and submitted the application to the [facility]  Cm will continue to support Ct's well-being

## 2023-04-24 ENCOUNTER — PATIENT OUTREACH (OUTPATIENT)
Dept: SURGERY | Facility: CLINIC | Age: 28
End: 2023-04-24

## 2023-04-25 NOTE — PROGRESS NOTES
Ct  Contacted Florentino requesting assistance with his [address]  Cm recently completed a hardship application for the client  Cm is still waiting to receive the outcome of the application  Cm asked for the hospital bill documentation from the client, and the client agreed to bring it

## 2023-04-27 ENCOUNTER — PATIENT OUTREACH (OUTPATIENT)
Dept: SURGERY | Facility: CLINIC | Age: 28
End: 2023-04-27

## 2023-04-27 NOTE — PROGRESS NOTES
The Client contacted Cm to discuss his hospital treatment bills  Cm suggested the client provide the hospital services payments to review it and better assist him with a possible RW authorization or another HARDSHIP application  The Client's  has a higher income, and the client is his dependent  Cm will follow up with previous HARDSHIP application status

## 2023-04-28 ENCOUNTER — PATIENT OUTREACH (OUTPATIENT)
Dept: SURGERY | Facility: CLINIC | Age: 28
End: 2023-04-28

## 2023-04-28 NOTE — PROGRESS NOTES
The Client has been invited to participate in the Functional Fitness program held every Thursday from 1 pm to 2 pm by [employer]

## 2023-05-01 ENCOUNTER — PATIENT OUTREACH (OUTPATIENT)
Dept: SURGERY | Facility: CLINIC | Age: 28
End: 2023-05-01

## 2023-05-01 NOTE — PROGRESS NOTES
Florentino e-mailed the Financial Counselor, Mrs Romeo Tanner, to determine the client's HARDSHIP application status  The Client is receiving hospital bills that, according to him, he cannot afford

## 2023-05-02 ENCOUNTER — PATIENT OUTREACH (OUTPATIENT)
Dept: SURGERY | Facility: CLINIC | Age: 28
End: 2023-05-02

## 2023-05-02 NOTE — PROGRESS NOTES
The Client will receive educational HIV preventive medications to support the client's lifestyle management

## 2023-05-03 ENCOUNTER — PATIENT OUTREACH (OUTPATIENT)
Dept: SURGERY | Facility: CLINIC | Age: 28
End: 2023-05-03

## 2023-05-03 NOTE — PROGRESS NOTES
Cm e-mailed the financial Counselor team to find out about Ct 's HARDSHIP application status  Unfortunately, cm is still waiting to receive a follow-up

## 2023-05-04 ENCOUNTER — PATIENT OUTREACH (OUTPATIENT)
Dept: SURGERY | Facility: CLINIC | Age: 28
End: 2023-05-04

## 2023-05-04 NOTE — PROGRESS NOTES
Cm received a HARDSHIP pending status from the Financial Counselor  Cm will continue to support the Client wellness being

## 2023-05-08 ENCOUNTER — PATIENT OUTREACH (OUTPATIENT)
Dept: SURGERY | Facility: CLINIC | Age: 28
End: 2023-05-08

## 2023-05-10 ENCOUNTER — PATIENT OUTREACH (OUTPATIENT)
Dept: SURGERY | Facility: CLINIC | Age: 28
End: 2023-05-10

## 2023-05-11 ENCOUNTER — PATIENT OUTREACH (OUTPATIENT)
Dept: SURGERY | Facility: CLINIC | Age: 28
End: 2023-05-11

## 2023-05-11 NOTE — PROGRESS NOTES
The Client has been complaining about his medical care co-payments  Cm completed a HARDSHIP application which is still in process  The Client is now working  Therefore Florentino has requested his pay stub to arrange his annual income since he is no longer a dependent

## 2023-05-11 NOTE — PROGRESS NOTES
The Client was registered to receive medical care services as his spouse dependent  The Client has disclosed to Cm that he started a new job today 5/11/23, therefore Cm suggested Ct after two weeks of salary to bring his two most recent pay stub to addend his annual report as his medical care co-payment as independent, Client agreed

## 2023-05-15 ENCOUNTER — PATIENT OUTREACH (OUTPATIENT)
Dept: SURGERY | Facility: CLINIC | Age: 28
End: 2023-05-15

## 2023-05-15 NOTE — PROGRESS NOTES
Florentino assisted the client in completing his assessment over the phone  As a result, CT will provide his recent pay stub to addend his annual income calculation and will be referred to the [address] [place] [address] dental clinic  During our conversation, the client mentioned discussing his follow-up appointment frequency with Dr Marija Warner Cm linked the client to the clinician to make such appointment changes

## 2023-05-16 ENCOUNTER — PATIENT OUTREACH (OUTPATIENT)
Dept: SURGERY | Facility: CLINIC | Age: 28
End: 2023-05-16

## 2023-05-16 NOTE — PROGRESS NOTES
During a discussion with Supervisor Mrs Tammy Murry addressed the issue of a client's recent employment status  The client was hesitant to pay his medical care co-payment and wanted to be considered an independent earner    Connie Head informed Cm that if the client is still , his employment income will be combined with his spouse's  Cm suggested that the client apply for the [employer] program, which is currently being processed  Despite being responsible for his co-payment, the client may not qualify for Case Management  services due to his high income  The client will provide their first two pay stubs to arrange his annual payment

## 2023-05-18 ENCOUNTER — PATIENT OUTREACH (OUTPATIENT)
Dept: SURGERY | Facility: CLINIC | Age: 28
End: 2023-05-18

## 2023-05-18 NOTE — PROGRESS NOTES
The client contacted  to share that the hospital denied his hardship application  Due to this, he has been hesitant to pay his hospital care bills  Cm clarified that the client's household income exceeds the  poverty guidelines by over 300%, making him ineligible for case management services  However, the client can still receive medical care and must pay his co-payment to the hospital  After the explanation, the client agreed to fulfill his payment obligation

## 2023-05-19 ENCOUNTER — PATIENT OUTREACH (OUTPATIENT)
Dept: SURGERY | Facility: CLINIC | Age: 28
End: 2023-05-19

## 2023-05-19 NOTE — PROGRESS NOTES
Florentino contacted the client to arrange an in-person meeting at the office  Ct agreed to meet on Monday, May 22, 2023, at 11:00 a m  During the meeting, Cm will discuss the client's preventive medical care, as he will receive ongoing medical attention  Terbinafine Counseling: Patient counseling regarding adverse effects of terbinafine including but not limited to headache, diarrhea, rash, upset stomach, liver function test abnormalities, itching, taste/smell disturbance, nausea, abdominal pain, and flatulence.  There is a rare possibility of liver failure that can occur when taking terbinafine.  The patient understands that a baseline LFT and kidney function test may be required. The patient verbalized understanding of the proper use and possible adverse effects of terbinafine.  All of the patient's questions and concerns were addressed.

## 2023-05-22 ENCOUNTER — PATIENT OUTREACH (OUTPATIENT)
Dept: SURGERY | Facility: CLINIC | Age: 28
End: 2023-05-22

## 2023-05-22 NOTE — PROGRESS NOTES
The client and the Cm held a meeting to discuss his income increase  During the meeting, Cm explained the poverty guidelines that need to be met in order to continue receiving case management services  However, it was discovered that the client's income exceeds 300% of the permitted guideline  The client will bring four most recent pay stubs to calculate his new income and may have to be disenrolled from the case management program      Nonetheless, the client will still be able to receive ongoing medical care from Dr Юлия Ferreira

## 2023-05-26 ENCOUNTER — PATIENT OUTREACH (OUTPATIENT)
Dept: SURGERY | Facility: CLINIC | Age: 28
End: 2023-05-26

## 2023-05-26 NOTE — PROGRESS NOTES
The Client has been invited to join to participate in [facility]  The Invitation has been sent to the client's home mailing address

## 2023-06-02 DIAGNOSIS — B20 CURRENTLY ASYMPTOMATIC HIV INFECTION, WITH HISTORY OF HIV-RELATED ILLNESS (HCC): ICD-10-CM

## 2023-06-05 RX ORDER — BICTEGRAVIR SODIUM, EMTRICITABINE, AND TENOFOVIR ALAFENAMIDE FUMARATE 50; 200; 25 MG/1; MG/1; MG/1
1 TABLET ORAL
Qty: 90 TABLET | Refills: 0 | Status: SHIPPED | OUTPATIENT
Start: 2023-06-05

## 2023-06-07 ENCOUNTER — PATIENT OUTREACH (OUTPATIENT)
Dept: SURGERY | Facility: CLINIC | Age: 28
End: 2023-06-07

## 2023-06-07 NOTE — PROGRESS NOTES
As part of the client's wellness care plan, the client will receive the summer cooking class invitation to the home mailing address

## 2023-06-09 ENCOUNTER — PATIENT OUTREACH (OUTPATIENT)
Dept: SURGERY | Facility: CLINIC | Age: 28
End: 2023-06-09

## 2023-06-15 ENCOUNTER — PATIENT OUTREACH (OUTPATIENT)
Dept: SURGERY | Facility: CLINIC | Age: 28
End: 2023-06-15

## 2023-06-16 NOTE — PROGRESS NOTES
Florentino called the client to follow up on his preventive care plan and unfortunately, the client did not responded to Cm phone call  Florentino leave a voice message explaining the purposes of the phone call

## 2023-06-19 ENCOUNTER — PATIENT OUTREACH (OUTPATIENT)
Dept: SURGERY | Facility: CLINIC | Age: 28
End: 2023-06-19

## 2023-06-19 NOTE — PROGRESS NOTES
Cm contacted the client and kindly requested his most recent pay stub to help him plan his budget  The client agreed to drop it off at the   Although the client no longer meets the qualifications for a , he will still receive medical care services

## 2023-06-22 ENCOUNTER — PATIENT OUTREACH (OUTPATIENT)
Dept: SURGERY | Facility: CLINIC | Age: 28
End: 2023-06-22

## 2023-06-22 NOTE — PROGRESS NOTES
Cm contacted the client to remind him about his pay stub submission  The client mentioned that he hadn’t had time to bring in the pay stubs but would try to drop them off at the  next week

## 2023-06-27 ENCOUNTER — TELEPHONE (OUTPATIENT)
Dept: SURGERY | Facility: CLINIC | Age: 28
End: 2023-06-27

## 2023-06-27 DIAGNOSIS — B20 HIV DISEASE (HCC): Primary | ICD-10-CM

## 2023-06-27 NOTE — TELEPHONE ENCOUNTER
LVM for patient to call me back, wanted to remind him to get his labs done for his ID appointment on 7/5

## 2023-06-29 ENCOUNTER — TELEPHONE (OUTPATIENT)
Dept: SURGERY | Facility: CLINIC | Age: 28
End: 2023-06-29

## 2023-06-29 ENCOUNTER — PATIENT OUTREACH (OUTPATIENT)
Dept: SURGERY | Facility: CLINIC | Age: 28
End: 2023-06-29

## 2023-06-29 NOTE — TELEPHONE ENCOUNTER
LVM for patient to call back, wanted to remind him to barry his labs done for his ID appointment on 7/5

## 2023-06-29 NOTE — PROGRESS NOTES
The Client will receive a Comprehensive package from the [address] [place] [address] to support the client’s wellness care  The Client contacted the Cm to confirm that he will be submitting his recent pay stubs

## 2023-06-30 ENCOUNTER — APPOINTMENT (OUTPATIENT)
Dept: LAB | Facility: CLINIC | Age: 28
End: 2023-06-30
Payer: COMMERCIAL

## 2023-06-30 LAB
ALBUMIN SERPL BCP-MCNC: 3.9 G/DL (ref 3.5–5)
ALP SERPL-CCNC: 50 U/L (ref 46–116)
ALT SERPL W P-5'-P-CCNC: 24 U/L (ref 12–78)
ANION GAP SERPL CALCULATED.3IONS-SCNC: 3 MMOL/L
AST SERPL W P-5'-P-CCNC: 20 U/L (ref 5–45)
BASOPHILS # BLD AUTO: 0.01 THOUSANDS/ÂΜL (ref 0–0.1)
BASOPHILS NFR BLD AUTO: 0 % (ref 0–1)
BILIRUB SERPL-MCNC: 1.29 MG/DL (ref 0.2–1)
BUN SERPL-MCNC: 11 MG/DL (ref 5–25)
CALCIUM SERPL-MCNC: 9.1 MG/DL (ref 8.3–10.1)
CHLORIDE SERPL-SCNC: 110 MMOL/L (ref 96–108)
CO2 SERPL-SCNC: 24 MMOL/L (ref 21–32)
CREAT SERPL-MCNC: 1.03 MG/DL (ref 0.6–1.3)
EOSINOPHIL # BLD AUTO: 0.06 THOUSAND/ÂΜL (ref 0–0.61)
EOSINOPHIL NFR BLD AUTO: 1 % (ref 0–6)
ERYTHROCYTE [DISTWIDTH] IN BLOOD BY AUTOMATED COUNT: 12.7 % (ref 11.6–15.1)
GFR SERPL CREATININE-BSD FRML MDRD: 99 ML/MIN/1.73SQ M
GLUCOSE P FAST SERPL-MCNC: 97 MG/DL (ref 65–99)
HCT VFR BLD AUTO: 44.2 % (ref 36.5–49.3)
HGB BLD-MCNC: 15 G/DL (ref 12–17)
IMM GRANULOCYTES # BLD AUTO: 0.01 THOUSAND/UL (ref 0–0.2)
IMM GRANULOCYTES NFR BLD AUTO: 0 % (ref 0–2)
LYMPHOCYTES # BLD AUTO: 1.77 THOUSANDS/ÂΜL (ref 0.6–4.47)
LYMPHOCYTES NFR BLD AUTO: 42 % (ref 14–44)
MCH RBC QN AUTO: 30.4 PG (ref 26.8–34.3)
MCHC RBC AUTO-ENTMCNC: 33.9 G/DL (ref 31.4–37.4)
MCV RBC AUTO: 90 FL (ref 82–98)
MONOCYTES # BLD AUTO: 0.37 THOUSAND/ÂΜL (ref 0.17–1.22)
MONOCYTES NFR BLD AUTO: 9 % (ref 4–12)
NEUTROPHILS # BLD AUTO: 2.02 THOUSANDS/ÂΜL (ref 1.85–7.62)
NEUTS SEG NFR BLD AUTO: 48 % (ref 43–75)
NRBC BLD AUTO-RTO: 0 /100 WBCS
PLATELET # BLD AUTO: 224 THOUSANDS/UL (ref 149–390)
PMV BLD AUTO: 10.5 FL (ref 8.9–12.7)
POTASSIUM SERPL-SCNC: 3.7 MMOL/L (ref 3.5–5.3)
PROT SERPL-MCNC: 7.4 G/DL (ref 6.4–8.4)
RBC # BLD AUTO: 4.94 MILLION/UL (ref 3.88–5.62)
SODIUM SERPL-SCNC: 137 MMOL/L (ref 135–147)
WBC # BLD AUTO: 4.24 THOUSAND/UL (ref 4.31–10.16)

## 2023-07-01 LAB
BASOPHILS # BLD AUTO: 0 X10E3/UL (ref 0–0.2)
BASOPHILS NFR BLD AUTO: 1 %
CD3+CD4+ CELLS # BLD: 811 /UL (ref 359–1519)
CD3+CD4+ CELLS NFR BLD: 42.7 % (ref 30.8–58.5)
EOSINOPHIL # BLD AUTO: 0.1 X10E3/UL (ref 0–0.4)
EOSINOPHIL NFR BLD AUTO: 1 %
ERYTHROCYTE [DISTWIDTH] IN BLOOD BY AUTOMATED COUNT: 12.4 % (ref 11.6–15.4)
HCT VFR BLD AUTO: 44.9 % (ref 37.5–51)
HGB BLD-MCNC: 14.8 G/DL (ref 13–17.7)
IMM GRANULOCYTES # BLD: 0 X10E3/UL (ref 0–0.1)
IMM GRANULOCYTES NFR BLD: 0 %
LYMPHOCYTES # BLD AUTO: 1.9 X10E3/UL (ref 0.7–3.1)
LYMPHOCYTES NFR BLD AUTO: 45 %
MCH RBC QN AUTO: 30.6 PG (ref 26.6–33)
MCHC RBC AUTO-ENTMCNC: 33 G/DL (ref 31.5–35.7)
MCV RBC AUTO: 93 FL (ref 79–97)
MONOCYTES # BLD AUTO: 0.3 X10E3/UL (ref 0.1–0.9)
MONOCYTES NFR BLD AUTO: 8 %
NEUTROPHILS # BLD AUTO: 1.9 X10E3/UL (ref 1.4–7)
NEUTROPHILS NFR BLD AUTO: 45 %
PLATELET # BLD AUTO: 235 X10E3/UL (ref 150–450)
RBC # BLD AUTO: 4.83 X10E6/UL (ref 4.14–5.8)
WBC # BLD AUTO: 4.2 X10E3/UL (ref 3.4–10.8)

## 2023-07-03 LAB
HIV1 RNA # SERPL NAA+PROBE: <20 COPIES/ML
HIV1 RNA SERPL NAA+PROBE-LOG#: NORMAL LOG10COPY/ML

## 2023-07-05 ENCOUNTER — DOCUMENTATION (OUTPATIENT)
Dept: SURGERY | Facility: CLINIC | Age: 28
End: 2023-07-05

## 2023-07-05 ENCOUNTER — OFFICE VISIT (OUTPATIENT)
Dept: SURGERY | Facility: CLINIC | Age: 28
End: 2023-07-05
Payer: COMMERCIAL

## 2023-07-05 ENCOUNTER — PATIENT OUTREACH (OUTPATIENT)
Dept: SURGERY | Facility: CLINIC | Age: 28
End: 2023-07-05

## 2023-07-05 VITALS
OXYGEN SATURATION: 98 % | WEIGHT: 225.8 LBS | HEART RATE: 82 BPM | HEIGHT: 74 IN | TEMPERATURE: 97.3 F | BODY MASS INDEX: 28.98 KG/M2 | SYSTOLIC BLOOD PRESSURE: 143 MMHG | RESPIRATION RATE: 17 BRPM | DIASTOLIC BLOOD PRESSURE: 65 MMHG

## 2023-07-05 DIAGNOSIS — Z13.6 ENCOUNTER FOR LIPID SCREENING FOR CARDIOVASCULAR DISEASE: ICD-10-CM

## 2023-07-05 DIAGNOSIS — B20 HIV DISEASE (HCC): Primary | ICD-10-CM

## 2023-07-05 DIAGNOSIS — Z11.3 ENCOUNTER FOR SCREENING FOR BACTERIAL SEXUALLY TRANSMITTED DISEASE: ICD-10-CM

## 2023-07-05 DIAGNOSIS — Z72.89 OTHER PROBLEMS RELATED TO LIFESTYLE: ICD-10-CM

## 2023-07-05 DIAGNOSIS — Z23 NEED FOR MENACTRA VACCINATION: ICD-10-CM

## 2023-07-05 DIAGNOSIS — Z13.220 ENCOUNTER FOR LIPID SCREENING FOR CARDIOVASCULAR DISEASE: ICD-10-CM

## 2023-07-05 DIAGNOSIS — R80.9 PROTEINURIA, UNSPECIFIED TYPE: ICD-10-CM

## 2023-07-05 DIAGNOSIS — Z20.2 CONTACT WITH AND (SUSPECTED) EXPOSURE TO INFECTIONS WITH A PREDOMINANTLY SEXUAL MODE OF TRANSMISSION: ICD-10-CM

## 2023-07-05 DIAGNOSIS — Z13.1 SCREENING FOR DIABETES MELLITUS: ICD-10-CM

## 2023-07-05 DIAGNOSIS — E80.4 GILBERT'S SYNDROME: ICD-10-CM

## 2023-07-05 DIAGNOSIS — D72.89 OTHER SPECIFIED DISORDERS OF WHITE BLOOD CELLS: ICD-10-CM

## 2023-07-05 DIAGNOSIS — I10 PRIMARY HYPERTENSION: ICD-10-CM

## 2023-07-05 DIAGNOSIS — Z11.1 SCREENING-PULMONARY TB: ICD-10-CM

## 2023-07-05 PROCEDURE — 90471 IMMUNIZATION ADMIN: CPT

## 2023-07-05 PROCEDURE — 90619 MENACWY-TT VACCINE IM: CPT

## 2023-07-05 PROCEDURE — 99214 OFFICE O/P EST MOD 30 MIN: CPT | Performed by: INTERNAL MEDICINE

## 2023-07-05 NOTE — PROGRESS NOTES
Progress Note - Infectious Disease   Anna Marie Da Silvai. 32 y.o. male MRN: [id number]  Unit/Bed#:  Encounter: [id number]      Impression/Plan:  1. HIV. Doing well on Biktarvy with an undetectable viral load and a CD4 count of greater than 800. Continue ART, recheck labs in 2 months and follow-up in 3 months. Stressed adherence. If remains undetectable at next visit, consider transitioning to every 6-month follow-up     2. Gilbert syndrome. Monitor LFTs. Clinically inconsequential.     3.  Primary hypertension. Asymptomatic. Discussed with the primary who will address     4.  Weight gain. Continues to gain weight. Explained to patient that there is an association between [facility] and weight gain and he will need to watch his weight carefully with improved diet and increased exercise. 5.  Leukopenia. Mild and likely clinically inconsequential.  Will monitor for now. Patient was provided medication, adherence and prevention education    Subjective:  Routine follow-up for HIV. Patient claims 100% adherence with Biktarvy. Patient denies any notable side effects. Overall the feeling well. The patient denies any fever chills or sweats, denies any nausea vomiting or diarrhea, denies any cough or shortness of breath. ROS:  A complete review of systems is negative other than that noted above in the subjective    Followup portions patient history reviewed and updated as: Allergies, current medications, past medical history, past social history, past surgical history, and the problem list    Objective:  Vitals:  Vitals:    07/05/23 1618   BP: 143/65   BP Location: Right arm   Patient Position: Sitting   Cuff Size: Standard   Pulse: 82   Resp: 17   Temp: (!) 97.3 °F (36.3 °C)   TempSrc: Tympanic   SpO2: 98%   Weight: 102 kg (225 lb 12.8 oz)   Height: 6' 2" (1.88 m)       Physical Exam:   General Appearance:  Alert, interactive, appearing well,  nontoxic, no acute distress.    Neck:   Supple without lymphadenopathy, no thyromegaly or masses   Throat: Oropharynx moist without lesions. Lungs:   Clear to auscultation bilaterally; no wheezes, rhonchi or rales; respirations unlabored   Heart:  RRR; no murmur, rub or gallop   Abdomen:   Soft, non-tender, non-distended, positive bowel sounds. Extremities: No clubbing, cyanosis or edema   Skin: No new rashes or lesions. No draining wounds noted. Labs, Imaging, & Other studies:   All pertinent labs and imaging studies were personally reviewed    Lab Results   Component Value Date    K 3.7 06/30/2023     (H) 06/30/2023    CO2 24 06/30/2023    BUN 11 06/30/2023    CREATININE 1.03 06/30/2023    GLUF 97 06/30/2023    CALCIUM 9.1 06/30/2023    AST 20 06/30/2023    ALT 24 06/30/2023    ALKPHOS 50 06/30/2023    EGFR 99 06/30/2023     Lab Results   Component Value Date    WBC 4.24 (L) 06/30/2023    WBC 4.2 06/30/2023    HGB 15.0 06/30/2023    HGB 14.8 06/30/2023    HCT 44.2 06/30/2023    HCT 44.9 06/30/2023    MCV 90 06/30/2023    MCV 93 06/30/2023     06/30/2023     06/30/2023     Lab Results   Component Value Date    HEPCAB Non-reactive 11/16/2022     Lab Results   Component Value Date    HAV Reactive (A) 11/16/2022    HEPCAB Non-reactive 11/16/2022     Lab Results   Component Value Date    RPR Non-Reactive 11/16/2022     CD4 ABS   Date/Time Value Ref Range Status   06/30/2023 10:05  359 - 1519 /uL Final     HIV-1 RNA by PCR, Qn   Date/Time Value Ref Range Status   06/30/2023 10:05 AM <20 copies/mL Final     Comment:     HIV-1 RNA not detected  The reportable range for this assay is 20 to 10,000,000  copies HIV-1 RNA/mL.      No results found for: "[id number]"        Current Outpatient Medications:   •  Biktarvy -25 MG tablet, TAKE 1 TABLET BY MOUTH DAILY WITH BREAKFAST, Disp: 90 tablet, Rfl: 0

## 2023-07-06 NOTE — PROGRESS NOTES
RD met briefly with Lizz Rangel in conjunction with ID clinic appt. Pt had no questions, concerns, or issues he wished to address with RD at this time. Continue to monitor pt weight, has been steadily gaining since establishing care at [facility]. Wt Readings from Last 3 Encounters:   07/05/23 102 kg (225 lb 12.8 oz)   04/05/23 101 kg (222 lb 13.9 oz)   03/28/23 100 kg (220 lb 9.6 oz)     Offer further education and f/u as time allows and as he is receptive to it. Encounter was limited due to timing of ID clinic appt, and meeting with Bethel Hill. RD welcomed him to reach out at any time as needed.      Karla Alvarenga, MS, RD, LDN

## 2023-07-07 NOTE — PROGRESS NOTES
Pastoral Care Progress Note    2023  Patient: Ishan Prakash. : 1995  Encounter Date & Time: 2023   MRN: [id number]          The  met with M. Limmie Canavan for continued care and support. Interpretor assisted with encounter. Mr. Limmie Canavan shared he is not a part of a Catholic Tenriism but was very involved and worked at an [employer] when he was in his home country. "I can communicate with God on my own and I don't want to deal with the homophobia." Mr. Limmie Canavan shared his partner is supportive and have similar beliefs. Mr. Limmie Canavan does not have any cultural or Catholic beliefs that would influence his treatment or care. The  educated Mr. Limmie Canavan on chaplaincy and explored aime and beliefs. Mr. Limmie Canavan stated he did not need any specific spiritual support at this time. Further support as needed. Chaplaincy Interventions Utilized:   Empowerment: Provided chaplaincy education    Exploration: Explored spiritual needs & resources    Collaboration: Consulted with interdisciplinary team   Patient Navigator provided interpretation.      Relationship Building: Cultivated a relationship of care and support and Listened empathically      Chaplaincy Outcomes Achieved:  Expressed gratitude          Spiritual Coping Strategies Utilized:   Connectedness, Spiritual comfort and Spiritual growth or transformation

## 2023-07-10 ENCOUNTER — PATIENT OUTREACH (OUTPATIENT)
Dept: SURGERY | Facility: CLINIC | Age: 28
End: 2023-07-10

## 2023-07-10 NOTE — PROGRESS NOTES
To assess the enrollment of the client for case management, Cm requested his latest pay stub as proof of income. However, the client has not provided the requested information yet. Cm will keep attempting to communicate with the client and encourage his participation.

## 2023-07-17 ENCOUNTER — PATIENT OUTREACH (OUTPATIENT)
Dept: SURGERY | Facility: CLINIC | Age: 28
End: 2023-07-17

## 2023-08-18 ENCOUNTER — PATIENT OUTREACH (OUTPATIENT)
Dept: SURGERY | Facility: CLINIC | Age: 28
End: 2023-08-18

## 2023-08-18 NOTE — PROGRESS NOTES
Cm sent a “Save the Day" flyer to support the client's wellness care to the client's mailing address.

## 2023-08-23 ENCOUNTER — PATIENT OUTREACH (OUTPATIENT)
Dept: SURGERY | Facility: CLINIC | Age: 28
End: 2023-08-23

## 2023-08-23 NOTE — PROGRESS NOTES
Critical Care Consultation    Date of consult: 10/15/2018    Referring Physician  Mona Hernandez M.D.    Reason for Consultation  Septic shock    History of Presenting Illness  56 y.o. male who presented 10/15/2018 with With not feeling well for the past 24 hours.  He has a past medical history of multiple myeloma.  He has been very fatigued along with having chills and sweats.  He has not had any blood in his stool is not had any vomiting with blood.  Denies any diarrhea.  He is not having dysuria or abdominal pain.  He is found to be hypotensive, central line was placed in the ER and he is currently on Levophed.  He follows with Dr. Coreas per oncology.  Patient has limited English, discussed this with him he and his family at bedside of whom translated.  He was able to answer most questions.  He had a recent bone marrow biopsy on 12 October.    Code Status  Full code    Review of Systems  Review of Systems   Constitutional: Positive for activity change, chills, fatigue and fever. Negative for diaphoresis.   HENT: Negative for congestion, mouth sores, sinus pain, trouble swallowing and voice change.    Eyes: Negative for photophobia and visual disturbance.   Respiratory: Negative for cough, chest tightness, shortness of breath and wheezing.    Cardiovascular: Negative for chest pain, palpitations and leg swelling.   Gastrointestinal: Negative for abdominal distention, abdominal pain and nausea.   Endocrine: Negative for cold intolerance and heat intolerance.   Genitourinary: Negative for difficulty urinating and dysuria.   Musculoskeletal: Negative for arthralgias and back pain.   Skin: Negative for color change and rash.   Neurological: Negative for dizziness, seizures, speech difficulty, weakness and headaches.   Hematological: Negative for adenopathy. Does not bruise/bleed easily.   Psychiatric/Behavioral: Negative for agitation and confusion.       Past Medical History   has a past medical history of  The client will receive a monthly invitation to [facility] as part of their wellness care. Multiple myeloma without remission (HCC).    Surgical History   has a past surgical history that includes bone marrow aspiration (Right, 10/12/2018) and bone marrow biopsy, ndl/trocar (Left, 10/12/2018).    Family History  Non contributory    Social History   reports that he has never smoked. He has never used smokeless tobacco. He reports that he does not drink alcohol or use drugs.    Medications  Home Medications    **Home medications have not yet been reviewed for this encounter**       Current Facility-Administered Medications   Medication Dose Route Frequency Provider Last Rate Last Dose   • phenylephrine (DOROTYH-SYNEPHRINE) 100 mcg/mL inj (ER IV Push Syringe) 100-200 mcg  100-200 mcg Intravenous Q5 MIN PRN Mona Hernandez M.D.       • norepinephrine (LEVOPHED) 8 mg in  mL Infusion  0-30 mcg/min Intravenous Continuous Mona Hernandez M.D. 37.5 mL/hr at 10/15/18 1357 20 mcg/min at 10/15/18 1357   • vancomycin 1,600 mg in  mL IVPB  25 mg/kg (Order-Specific) Intravenous Once Mona Hernandez M.D.         Current Outpatient Prescriptions   Medication Sig Dispense Refill   • amoxicillin-clavulanate (AUGMENTIN) 875-125 MG Tab TK 1 T PO BID  0   • amoxicillin (AMOXIL) 500 MG Cap Take 500 mg by mouth 3 times a day.     • Pomalidomide (POMALYST) 2 MG Cap Take  by mouth.     • tramadol (ULTRAM) 50 MG Tab TAKE ONE TABLET BY MOUTH DAILY AT BEDTIME AS NEEDED FOR 30 DAYS  1   • acyclovir (ZOVIRAX) 400 MG tablet Take 400 mg by mouth 2 times a day.         Allergies  No Known Allergies    Vital Signs last 24 hours  Temp:  [37.1 °C (98.7 °F)] 37.1 °C (98.7 °F)  Pulse:  [] 119  Resp:  [14-18] 18  BP: (62-69)/(40-49) 69/49    Physical Exam  Physical Exam   Constitutional: He is oriented to person, place, and time. He appears distressed.   Pale in appearance, fatigued   HENT:   Head: Normocephalic and atraumatic.   Right Ear: External ear normal.   Left Ear: External ear normal.   Mouth/Throat: No oropharyngeal  exudate.   Eyes: Pupils are equal, round, and reactive to light. Conjunctivae and EOM are normal. Right eye exhibits no discharge. Left eye exhibits no discharge.   Neck: No JVD present. No tracheal deviation present.   Cardiovascular: Normal heart sounds.    No murmur heard.  Pulmonary/Chest: Effort normal and breath sounds normal. No stridor. No respiratory distress. He has no wheezes. He has no rales. He exhibits no tenderness.   Abdominal: He exhibits no mass. There is no tenderness. There is no rebound and no guarding.   Musculoskeletal: He exhibits no edema, tenderness or deformity.   Neurological: He is alert and oriented to person, place, and time. He displays normal reflexes. No cranial nerve deficit. Coordination normal.   Skin: Skin is warm and dry. No rash noted. He is not diaphoretic. No erythema.   Psychiatric:   fatibued       Fluids  No intake or output data in the 24 hours ending 10/15/18 1410    Laboratory  Recent Results (from the past 48 hour(s))   Lactic acid (lactate)    Collection Time: 10/15/18 12:55 PM   Result Value Ref Range    Lactic Acid 9.9 (HH) 0.5 - 2.0 mmol/L   COMP METABOLIC PANEL    Collection Time: 10/15/18 12:55 PM   Result Value Ref Range    Sodium 135 135 - 145 mmol/L    Potassium 4.9 3.6 - 5.5 mmol/L    Chloride 103 96 - 112 mmol/L    Glucose 284 (H) 65 - 99 mg/dL    Bun 32 (H) 8 - 22 mg/dL    Creatinine 1.69 (H) 0.50 - 1.40 mg/dL    Calcium 9.9 8.5 - 10.5 mg/dL    AST(SGOT) 18 12 - 45 U/L    ALT(SGPT) 8 2 - 50 U/L    Alkaline Phosphatase 117 (H) 30 - 99 U/L    Total Bilirubin 1.4 0.1 - 1.5 mg/dL    Albumin 3.9 3.2 - 4.9 g/dL    Total Protein 6.3 6.0 - 8.2 g/dL    Globulin 2.4 1.9 - 3.5 g/dL    A-G Ratio 1.6 g/dL   ESTIMATED GFR    Collection Time: 10/15/18 12:55 PM   Result Value Ref Range    GFR If  51 (A) >60 mL/min/1.73 m 2    GFR If Non  42 (A) >60 mL/min/1.73 m 2       Imaging  DX-CHEST-PORTABLE (1 VIEW)   Final Result      Left  trans-subclavian line tip overlies the origin of the SVC. No pneumothorax.      CT-RENAL COLIC EVALUATION(A/P W/O)    (Results Pending)     Reviewed per myself    Assessment/Plan  Anemia- (present on admission)   Assessment & Plan    Severe anemia, 5.4 hemoglobin on admission.  Given 2 units of packed red blood cells.  No obvious sources of bleeding.  Likely due to multiple myeloma.  Transfusion goal greater than 7 hemoglobin        Thrombocytopenia (HCC)- (present on admission)   Assessment & Plan    Thrombocytopenia, most likely due to multiple myeloma.  Poor prognosis regarding sepsis.  Avoid anticoagulation.  SCD  Continue to monitor.  If any bleeding, consider platelet transfusion.        Shock (HCC)- (present on admission)   Assessment & Plan    Likely secondary to sepsis.  Received aggressive IV fluid resuscitation.  On Levophed, keep map greater than 65.  Hydrocortisone 50 mg 3 times daily for 3 days.  Blood cultures pending.  Vancomycin and Zosyn.  Most likely bacteremia, unclear source, may be due to frequent hospitalizations, he did have a recent bone marrow biopsy, and he does have multiple myeloma.        Multiple myeloma in relapse (HCC)- (present on admission)   Assessment & Plan    He has multiple myeloma.  Likely source of immunosuppression.  Contributory to bacteremia and severe sepsis.        Lactic acidosis   Assessment & Plan    Lactic acidosis to 7.2.  Likely secondary to septic shock.  Continue IV fluid and vasopressor resuscitation.        Acute renal injury (HCC)   Assessment & Plan    Likely secondary to shock.  Likely ATN.  Continue to monitor.        Severe sepsis (HCC)- (present on admission)   Assessment & Plan    Severe sepsis.  Unclear etiology.  Likely due to immunosuppression, bacteremia.  Blood cultures pending.  Vancomycin and Zosyn.  Levophed for map greater than 65, status post aggressive fluid resuscitation.        DVT px: scd  GI px, not indicated  Ab: vancomycin and  zosyn    Discussed patient condition and risk of morbidity and/or mortality with Hospitalist, Family, RN, Pharmacy and Patient.    The patient remains critically ill.  Critical care time = 50 minutes in directly providing and coordinating critical care and extensive data review.  No time overlap and excludes procedures.

## 2023-08-28 DIAGNOSIS — B20 CURRENTLY ASYMPTOMATIC HIV INFECTION, WITH HISTORY OF HIV-RELATED ILLNESS (HCC): ICD-10-CM

## 2023-08-28 RX ORDER — BICTEGRAVIR SODIUM, EMTRICITABINE, AND TENOFOVIR ALAFENAMIDE FUMARATE 50; 200; 25 MG/1; MG/1; MG/1
1 TABLET ORAL
Qty: 90 TABLET | Refills: 0 | Status: SHIPPED | OUTPATIENT
Start: 2023-08-28

## 2023-08-29 ENCOUNTER — PATIENT OUTREACH (OUTPATIENT)
Dept: SURGERY | Facility: CLINIC | Age: 28
End: 2023-08-29

## 2023-08-29 NOTE — PROGRESS NOTES
To support the client case management, Cm share the August 2023 [employer] activities Calendar to the client home mail address.

## 2023-09-13 ENCOUNTER — PATIENT OUTREACH (OUTPATIENT)
Dept: SURGERY | Facility: CLINIC | Age: 28
End: 2023-09-13

## 2023-09-14 ENCOUNTER — APPOINTMENT (OUTPATIENT)
Dept: LAB | Facility: CLINIC | Age: 28
End: 2023-09-14
Payer: COMMERCIAL

## 2023-09-14 DIAGNOSIS — B20 HIV DISEASE (HCC): Primary | ICD-10-CM

## 2023-09-14 DIAGNOSIS — Z11.1 SCREENING-PULMONARY TB: ICD-10-CM

## 2023-09-14 LAB
ALBUMIN SERPL BCP-MCNC: 4.4 G/DL (ref 3.5–5)
ALP SERPL-CCNC: 50 U/L (ref 34–104)
ALT SERPL W P-5'-P-CCNC: 13 U/L (ref 7–52)
ANION GAP SERPL CALCULATED.3IONS-SCNC: 9 MMOL/L
AST SERPL W P-5'-P-CCNC: 14 U/L (ref 13–39)
BASOPHILS # BLD AUTO: 0.01 THOUSANDS/ÂΜL (ref 0–0.1)
BASOPHILS NFR BLD AUTO: 0 % (ref 0–1)
BILIRUB SERPL-MCNC: 1.5 MG/DL (ref 0.2–1)
BILIRUB UR QL STRIP: NEGATIVE
BUN SERPL-MCNC: 14 MG/DL (ref 5–25)
CALCIUM SERPL-MCNC: 9.8 MG/DL (ref 8.4–10.2)
CHLORIDE SERPL-SCNC: 106 MMOL/L (ref 96–108)
CHOLEST SERPL-MCNC: 200 MG/DL
CLARITY UR: CLEAR
CO2 SERPL-SCNC: 25 MMOL/L (ref 21–32)
COLOR UR: COLORLESS
CREAT SERPL-MCNC: 0.89 MG/DL (ref 0.6–1.3)
EOSINOPHIL # BLD AUTO: 0.06 THOUSAND/ÂΜL (ref 0–0.61)
EOSINOPHIL NFR BLD AUTO: 1 % (ref 0–6)
ERYTHROCYTE [DISTWIDTH] IN BLOOD BY AUTOMATED COUNT: 12.3 % (ref 11.6–15.1)
EST. AVERAGE GLUCOSE BLD GHB EST-MCNC: 108 MG/DL
GFR SERPL CREATININE-BSD FRML MDRD: 116 ML/MIN/1.73SQ M
GLUCOSE P FAST SERPL-MCNC: 94 MG/DL (ref 65–99)
GLUCOSE UR STRIP-MCNC: NEGATIVE MG/DL
HBA1C MFR BLD: 5.4 %
HCT VFR BLD AUTO: 47.3 % (ref 36.5–49.3)
HCV AB SER QL: NORMAL
HDLC SERPL-MCNC: 41 MG/DL
HGB BLD-MCNC: 15.6 G/DL (ref 12–17)
HGB UR QL STRIP.AUTO: NEGATIVE
IMM GRANULOCYTES # BLD AUTO: 0.01 THOUSAND/UL (ref 0–0.2)
IMM GRANULOCYTES NFR BLD AUTO: 0 % (ref 0–2)
KETONES UR STRIP-MCNC: NEGATIVE MG/DL
LDLC SERPL CALC-MCNC: 133 MG/DL (ref 0–100)
LEUKOCYTE ESTERASE UR QL STRIP: NEGATIVE
LYMPHOCYTES # BLD AUTO: 1.82 THOUSANDS/ÂΜL (ref 0.6–4.47)
LYMPHOCYTES NFR BLD AUTO: 37 % (ref 14–44)
MCH RBC QN AUTO: 30.6 PG (ref 26.8–34.3)
MCHC RBC AUTO-ENTMCNC: 33 G/DL (ref 31.4–37.4)
MCV RBC AUTO: 93 FL (ref 82–98)
MONOCYTES # BLD AUTO: 0.37 THOUSAND/ÂΜL (ref 0.17–1.22)
MONOCYTES NFR BLD AUTO: 8 % (ref 4–12)
NEUTROPHILS # BLD AUTO: 2.64 THOUSANDS/ÂΜL (ref 1.85–7.62)
NEUTS SEG NFR BLD AUTO: 54 % (ref 43–75)
NITRITE UR QL STRIP: NEGATIVE
NONHDLC SERPL-MCNC: 159 MG/DL
NRBC BLD AUTO-RTO: 0 /100 WBCS
PH UR STRIP.AUTO: 7 [PH]
PLATELET # BLD AUTO: 221 THOUSANDS/UL (ref 149–390)
PMV BLD AUTO: 10.3 FL (ref 8.9–12.7)
POTASSIUM SERPL-SCNC: 3.9 MMOL/L (ref 3.5–5.3)
PROT SERPL-MCNC: 7.1 G/DL (ref 6.4–8.4)
PROT UR STRIP-MCNC: NEGATIVE MG/DL
RBC # BLD AUTO: 5.1 MILLION/UL (ref 3.88–5.62)
SODIUM SERPL-SCNC: 140 MMOL/L (ref 135–147)
SP GR UR STRIP.AUTO: 1 (ref 1–1.03)
TREPONEMA PALLIDUM IGG+IGM AB [PRESENCE] IN SERUM OR PLASMA BY IMMUNOASSAY: NORMAL
TRIGL SERPL-MCNC: 131 MG/DL
UROBILINOGEN UR STRIP-ACNC: <2 MG/DL
WBC # BLD AUTO: 4.91 THOUSAND/UL (ref 4.31–10.16)

## 2023-09-14 PROCEDURE — 86480 TB TEST CELL IMMUN MEASURE: CPT

## 2023-09-14 NOTE — PROGRESS NOTES
The client will received the September calendar [place]'s activities calendar to support the morales's wellness.

## 2023-09-15 LAB
BASOPHILS # BLD AUTO: 0 X10E3/UL (ref 0–0.2)
BASOPHILS NFR BLD AUTO: 0 %
C TRACH DNA SPEC QL NAA+PROBE: NEGATIVE
CD3+CD4+ CELLS # BLD: 643 /UL (ref 359–1519)
CD3+CD4+ CELLS NFR BLD: 35.7 % (ref 30.8–58.5)
EOSINOPHIL # BLD AUTO: 0.1 X10E3/UL (ref 0–0.4)
EOSINOPHIL NFR BLD AUTO: 2 %
ERYTHROCYTE [DISTWIDTH] IN BLOOD BY AUTOMATED COUNT: 12.1 % (ref 11.6–15.4)
GAMMA INTERFERON BACKGROUND BLD IA-ACNC: 0.44 IU/ML
HCT VFR BLD AUTO: 43.4 % (ref 37.5–51)
HGB BLD-MCNC: 14.6 G/DL (ref 13–17.7)
HIV1 RNA # PLAS NAA DL=20: NOT DETECTED {COPIES}/ML
IMM GRANULOCYTES # BLD: 0 X10E3/UL (ref 0–0.1)
IMM GRANULOCYTES NFR BLD: 0 %
LYMPHOCYTES # BLD AUTO: 1.8 X10E3/UL (ref 0.7–3.1)
LYMPHOCYTES NFR BLD AUTO: 36 %
M TB IFN-G BLD-IMP: NEGATIVE
M TB IFN-G CD4+ BCKGRND COR BLD-ACNC: -0.12 IU/ML
M TB IFN-G CD4+ BCKGRND COR BLD-ACNC: -0.24 IU/ML
MCH RBC QN AUTO: 30.6 PG (ref 26.6–33)
MCHC RBC AUTO-ENTMCNC: 33.6 G/DL (ref 31.5–35.7)
MCV RBC AUTO: 91 FL (ref 79–97)
MITOGEN IGNF BCKGRD COR BLD-ACNC: 9.56 IU/ML
MONOCYTES # BLD AUTO: 0.4 X10E3/UL (ref 0.1–0.9)
MONOCYTES NFR BLD AUTO: 8 %
N GONORRHOEA DNA SPEC QL NAA+PROBE: NEGATIVE
NEUTROPHILS # BLD AUTO: 2.8 X10E3/UL (ref 1.4–7)
NEUTROPHILS NFR BLD AUTO: 54 %
PLATELET # BLD AUTO: 243 X10E3/UL (ref 150–450)
RBC # BLD AUTO: 4.77 X10E6/UL (ref 4.14–5.8)
WBC # BLD AUTO: 5.1 X10E3/UL (ref 3.4–10.8)

## 2023-09-29 ENCOUNTER — PATIENT OUTREACH (OUTPATIENT)
Dept: SURGERY | Facility: CLINIC | Age: 28
End: 2023-09-29

## 2023-10-04 ENCOUNTER — DOCUMENTATION (OUTPATIENT)
Dept: SURGERY | Facility: CLINIC | Age: 28
End: 2023-10-04

## 2023-10-04 ENCOUNTER — OFFICE VISIT (OUTPATIENT)
Dept: SURGERY | Facility: CLINIC | Age: 28
End: 2023-10-04
Payer: COMMERCIAL

## 2023-10-04 VITALS
HEIGHT: 74 IN | BODY MASS INDEX: 28.95 KG/M2 | SYSTOLIC BLOOD PRESSURE: 133 MMHG | TEMPERATURE: 98.7 F | HEART RATE: 86 BPM | RESPIRATION RATE: 18 BRPM | WEIGHT: 225.6 LBS | DIASTOLIC BLOOD PRESSURE: 58 MMHG | OXYGEN SATURATION: 99 %

## 2023-10-04 DIAGNOSIS — B20 HIV DISEASE (HCC): Primary | ICD-10-CM

## 2023-10-04 DIAGNOSIS — E80.4 GILBERT'S SYNDROME: ICD-10-CM

## 2023-10-04 DIAGNOSIS — I10 PRIMARY HYPERTENSION: ICD-10-CM

## 2023-10-04 DIAGNOSIS — Z23 NEED FOR INFLUENZA VACCINATION: ICD-10-CM

## 2023-10-04 PROCEDURE — 99214 OFFICE O/P EST MOD 30 MIN: CPT | Performed by: INTERNAL MEDICINE

## 2023-10-04 NOTE — PROGRESS NOTES
Progress Note - Infectious Disease   Chelle Wilson. 29 y.o. male MRN: [id number]  Unit/Bed#:  Encounter: [id number]      Impression/Plan:  1.  HIV.  Doing well on [address] [place] [address] with an undetectable viral load and a CD4 count of greater than 600.  Continue ART, recheck labs in 5 months and follow-up in 6 months.  Stressed adherence.     2. Darnella Dat syndrome.  Monitor LFTs.  Clinically inconsequential.     3.  Primary hypertension. Asymptomatic. Discussed with the primary who will address     4.  Weight gain. Stable weight. Will monitor for now.     5.  Leukopenia. Resolved. Patient was provided medication, adherence and prevention education    Subjective:  Routine follow-up for HIV. Patient claims 100% adherence with Biktarvy  . Patient denies any notable side effects. Overall the feeling well. The patient denies any fever chills or sweats, denies any nausea vomiting or diarrhea, denies any cough or shortness of breath. ROS:  A complete review of systems is negative other than that noted above in the subjective    Followup portions patient history reviewed and updated as: Allergies, current medications, past medical history, past social history, past surgical history, and the problem list    Objective:  Vitals:  Vitals:    10/04/23 1709   BP: 133/58   BP Location: Right arm   Patient Position: Sitting   Cuff Size: Large   Pulse: 86   Resp: 18   Temp: 98.7 °F (37.1 °C)   TempSrc: Tympanic   SpO2: 99%   Weight: 102 kg (225 lb 9.6 oz)   Height: 6' 2" (1.88 m)       Physical Exam:   General Appearance:  Alert, interactive, appearing well,  nontoxic, no acute distress. Neck:   Supple without lymphadenopathy, no thyromegaly or masses   Throat: Oropharynx moist without lesions. Lungs:   Clear to auscultation bilaterally; no wheezes, rhonchi or rales; respirations unlabored   Heart:  RRR; no murmur, rub or gallop   Abdomen:   Soft, non-tender, non-distended, positive bowel sounds.      Extremities: No clubbing, cyanosis or edema   Skin: No new rashes or lesions. No draining wounds noted. Labs, Imaging, & Other studies:   All pertinent labs and imaging studies were personally reviewed    Lab Results   Component Value Date    K 3.9 09/14/2023     09/14/2023    CO2 25 09/14/2023    BUN 14 09/14/2023    CREATININE 0.89 09/14/2023    GLUF 94 09/14/2023    CALCIUM 9.8 09/14/2023    AST 14 09/14/2023    ALT 13 09/14/2023    ALKPHOS 50 09/14/2023    EGFR 116 09/14/2023     Lab Results   Component Value Date    WBC 4.91 09/14/2023    WBC 5.1 09/14/2023    HGB 15.6 09/14/2023    HGB 14.6 09/14/2023    HCT 47.3 09/14/2023    HCT 43.4 09/14/2023    MCV 93 09/14/2023    MCV 91 09/14/2023     09/14/2023     09/14/2023     Lab Results   Component Value Date    HEPCAB Non-reactive 09/14/2023     Lab Results   Component Value Date    HAV Reactive (A) 11/16/2022    HEPCAB Non-reactive 09/14/2023     Lab Results   Component Value Date    RPR Non-Reactive 11/16/2022     CD4 ABS   Date/Time Value Ref Range Status   09/14/2023 10:34  359 - 1519 /uL Final     HIV-1 RNA by PCR, Qn   Date/Time Value Ref Range Status   06/30/2023 10:05 AM <20 copies/mL Final     Comment:     HIV-1 RNA not detected  The reportable range for this assay is 20 to 10,000,000  copies HIV-1 RNA/mL.      HIV-1 TARGET   Date/Time Value Ref Range Status   09/14/2023 10:34 AM Not Detected Not Detected Final           Current Outpatient Medications:   •  Biktarvy -25 MG tablet, TAKE 1 TABLET BY MOUTH DAILY WITH BREAKFAST, Disp: 90 tablet, Rfl: 0

## 2023-10-05 NOTE — PROGRESS NOTES
[facility] Annual Nutrition Assessment    Gary Sky is a 29 y.o. male seen by RD in conjunction with ID f/u  Milagros Chenll Carson.  is established patient (last annual was 11/30/2022)    PMHx:  HTN, weight gain       Clinical Data/Client History    CD4 count:  643  Viral load:  <20  ART:   [facility]      , Patient Navigator: Robyn PRINCE    : n/a    Food assistance: None    Living situation: House or apartment  and in [facility] factors: None noted nor discussed     Mobility:  self ambulates    Physical activity: Exercises at gym most days       Oral health concerns: Regular dental f/u; denied oral health concerns       Typical food/beverage intake:  Eats out ~1 time per week, not often     · Breakfast rice cakes and fruit, or bread with cream cheese or peanut butter   · Lunch rice, beans, chicken (baked or shredded), salad (variety of non starchy vegetables, with olive oil and vinegar or sometimes creamy dressing)  · Dinner similar to lunch  · Snacks protein bar (10 g protein), banana or other fruits, nuts, yogurt, oats   · Beverages water, coffee, gatorade   · Also mentioned eats beef, sausage, fish    Appetite: Good    OTC vitamin, mineral, herbal supplements: Pt considering whey protein supplement, otherwise no OTC supplements     Oral/enteral nutrition supplements:  n/a    GI problems: denied n/v/d/c    Food allergies/intolerances:  NKFA    Weight history:  210# (Nov-22)  222# (Apr-23)  225# (Jul-23)      Current body weight:  225# (102 kg)  Height:  6' 2" (1.88 m)  BMI:  28.97  IBW:  190# (86.4 kg)  %IBW  118%    Weight change: stable     Time period of weight change: 3 months      Nutrition-related labs:    Lab Results   Component Value Date    HGBA1C 5.4 09/14/2023    HGBA1C 5.1 11/16/2022     Lab Results   Component Value Date    GLUF 94 09/14/2023    LDLCALC 133 (H) 09/14/2023    CREATININE 0.89 09/14/2023     Lab Results   Component Value Date    CHOLESTEROL 200 (H) 09/14/2023    CHOLESTEROL 152 11/16/2022     Lab Results   Component Value Date    HDL 41 09/14/2023    HDL 34 (L) 11/16/2022     Lab Results   Component Value Date    TRIG 131 09/14/2023    TRIG 98 11/16/2022     Lab Results   Component Value Date    NONHDLC 159 09/14/2023     Borderline high LDL     Nutrition-related medications:     Current Outpatient Medications:   •  Biktarvy -25 MG tablet, TAKE 1 TABLET BY MOUTH DAILY WITH BREAKFAST, Disp: 90 tablet, Rfl: 0      Physical findings/skin integrity:  n/a      Nutrition Diagnosis    Problem: altered nutrition-related lab values (borderline high LDL)    Related to: energy intake > energy output over time  lack of prior nutrition education     As Evidenced By: abnormal labs (, CHOL 200) diet recall  patient interview       Estimated Nutritional Needs    [facility] REE: 2061 kcal    · ~5047-6776 kcal (based on: [REE x 1.6]-500 kcal; [30 kcal/kg BW]-750 kcal)  · ~100 g protein (based on: 1 g/kg BW)  · ~3060 ml fluid (based on: 30 ml/kg BW)      Current intake estimation: meeting needs       Nutrition Intervention/Recommendations    Nutrition education intervention: provided    Nutrition recommendations: Balanced diet, Cholesterol-lowering MNT, dietary protein v protein supplements       Supplement recommendations: Separate supplements at least 6 hrs from ART  ; recommend consuming adequate protein in the diet rather than relying on supplementation      Teaching Method: verbal  printed material (Heart Healthy MNT client education handout from [address] [place] [address], [address])    Person Educated: patient      Comprehension: very good    Receptivity: excellent    Expected compliance: excellent      Collaboration/referral of nutrition care:   Nutrition f/u as pt presents to clinic, or sooner should he reach out       Goals:  1.   Lipids WNL   -Limit servings of high fat meats, replace with salmon or chicken   -Pt to review information provided and discussed regarding cholesterol lowering MNT  -Balanced diet, and continue with exercise routine     2. Avoid weight gain   -Pt aware of weight gain since moving [place] the [place]  -Limit servings of high fat meats, replace with salmon or chicken   -Pt to review information provided and discussed regarding cholesterol lowering MNT  -Balanced diet, and continue with exercise routine     Monitoring/Evaluation:  1.  BW  2. Lipid panel   3. Dietary patterns   4. Physical activity     Visit Summary    RD met with Marcelo Ta. during his scheduled ID clinic appt, in order to complete [employer] required annual nutrition assessment. Marcelo Ta consumes fairly well balanced diet. Discussed his elevated LDL, and reviewed dietary modifications for management. Pt was very receptive and will review the printed information provided and begin implementing dietary changes. Discussed salmon and meal prep. He does prepare large batch of salad and doses it out for meals. Recommend that he stop the Gatorade also. Marcelo Ta inquired about whey protein. RD advised pt that his diet recall suggests good intake of protein, recommend that he consider food first as a means of consuming enough protein rather than resorting to supplement. Pt was quite receptive, also happy about saving money on buying protein supplement. He is active at the gym and trying to tone. Will continue to follow up pt pt as he presents to clinic for appts, he was encouraged to call with any questions or concerns regarding diet.       Heather Florence, MS, RD, LDN

## 2023-10-05 NOTE — PROGRESS NOTES
Data: Pt completed the PHQ-9 screening within ID clinic, yet [facility] couldn’t discuss the results at the time due to conflicting individual session. Pt scored was 0 as a display of minimal depressive traits without SI or HI. Results will be discussed with pt to address multidimensional stability on a phone f/u, or direct pt contact.

## 2023-10-06 ENCOUNTER — PATIENT OUTREACH (OUTPATIENT)
Dept: SURGERY | Facility: CLINIC | Age: 28
End: 2023-10-06

## 2023-10-06 NOTE — PROGRESS NOTES
The CM contacted the client via email and text message to remind him about submitting proof for flu and COVID-19 vaccinations.

## 2023-10-09 ENCOUNTER — PATIENT OUTREACH (OUTPATIENT)
Dept: SURGERY | Facility: CLINIC | Age: 28
End: 2023-10-09

## 2023-10-12 ENCOUNTER — PATIENT OUTREACH (OUTPATIENT)
Dept: SURGERY | Facility: CLINIC | Age: 28
End: 2023-10-12

## 2023-10-17 ENCOUNTER — PATIENT OUTREACH (OUTPATIENT)
Dept: SURGERY | Facility: CLINIC | Age: 28
End: 2023-10-17

## 2023-10-19 ENCOUNTER — PATIENT OUTREACH (OUTPATIENT)
Dept: SURGERY | Facility: CLINIC | Age: 28
End: 2023-10-19

## 2023-10-23 ENCOUNTER — PATIENT OUTREACH (OUTPATIENT)
Dept: SURGERY | Facility: CLINIC | Age: 28
End: 2023-10-23

## 2023-10-23 NOTE — PROGRESS NOTES
The  reminded the client of the upcoming EPIC recertification assessment letter due November 15th, 2023.

## 2023-10-24 ENCOUNTER — PATIENT OUTREACH (OUTPATIENT)
Dept: SURGERY | Facility: CLINIC | Age: 28
End: 2023-10-24

## 2023-10-24 NOTE — PROGRESS NOTES
The Care Manager attempted to contact the client to follow up on his preventive care plan, and will continue to do so.

## 2023-11-03 ENCOUNTER — PATIENT OUTREACH (OUTPATIENT)
Dept: SURGERY | Facility: CLINIC | Age: 28
End: 2023-11-03

## 2023-11-06 NOTE — PROGRESS NOTES
CM attempted to contact the client, and unfortunately, the client did not respond to CM's phone call. A voice and text message was left.

## 2023-11-08 ENCOUNTER — PATIENT OUTREACH (OUTPATIENT)
Dept: SURGERY | Facility: CLINIC | Age: 28
End: 2023-11-08

## 2023-11-08 NOTE — PROGRESS NOTES
CM e-mail the client to remind him about his Re-certification assessment. Kel, Mr. Hawley. I hope you're doing well. We last spoke when we met before. It's time to complete your assessment. However, before we proceed, I would like to confirm if you are still interested in receiving . Please respond to this email or call me with your decision. Have a great evening! Thank you.

## 2023-11-13 ENCOUNTER — PATIENT OUTREACH (OUTPATIENT)
Dept: SURGERY | Facility: CLINIC | Age: 28
End: 2023-11-13

## 2023-11-13 NOTE — PROGRESS NOTES
The Client still needs to respond to CM's phone calls and e-mail attempts to determine if he is still interested in case management.

## 2023-11-22 ENCOUNTER — PATIENT OUTREACH (OUTPATIENT)
Dept: SURGERY | Facility: CLINIC | Age: 28
End: 2023-11-22

## 2023-11-30 ENCOUNTER — PATIENT OUTREACH (OUTPATIENT)
Dept: SURGERY | Facility: CLINIC | Age: 28
End: 2023-11-30

## 2023-11-30 NOTE — PROGRESS NOTES
The Client contacted the CM requesting to schedule his re-cert date. APOORVA scheduled the client for Monday December 4, 2023 at 2:30 p.m.

## 2023-12-04 ENCOUNTER — PATIENT OUTREACH (OUTPATIENT)
Dept: SURGERY | Facility: CLINIC | Age: 28
End: 2023-12-04

## 2023-12-04 NOTE — PROGRESS NOTES
The Client agreed to meet with APOORVA to complete his Re-cert on Thursday December 7, 2023 at 11:00 a.m.

## 2023-12-06 ENCOUNTER — PATIENT OUTREACH (OUTPATIENT)
Dept: SURGERY | Facility: CLINIC | Age: 28
End: 2023-12-06

## 2023-12-06 NOTE — PROGRESS NOTES
CM emailed the client to confirm his Re-cert assessment appointment on Thursday December 7, 2023 at 10:00 a.m., but he has not responded.

## 2023-12-07 ENCOUNTER — PATIENT OUTREACH (OUTPATIENT)
Dept: SURGERY | Facility: CLINIC | Age: 28
End: 2023-12-07

## 2023-12-07 DIAGNOSIS — B20 CURRENTLY ASYMPTOMATIC HIV INFECTION, WITH HISTORY OF HIV-RELATED ILLNESS (HCC): ICD-10-CM

## 2023-12-07 RX ORDER — BICTEGRAVIR SODIUM, EMTRICITABINE, AND TENOFOVIR ALAFENAMIDE FUMARATE 50; 200; 25 MG/1; MG/1; MG/1
1 TABLET ORAL
Qty: 90 TABLET | Refills: 0 | Status: SHIPPED | OUTPATIENT
Start: 2023-12-07

## 2023-12-07 NOTE — PROGRESS NOTES
The Client and CM have completed the assessment, along with the SPBP renewal, and all necessary documents have been provided and signed. The Client has demonstrated good knowledge of HIV and is currently abstaining from any risky behavior practicing monogamous relationship. Furthermore, the Client has access to secure housing, income, and medical transportation. Additionally, the Client is complaint with his medical care and medication adherence despite having no history of substance abuse or mental health disorders. The client's main objective is to maintain stability in his health by adhering to consistent medication and medical care. The Client focuses on achieving his goals through fitness and healthy eating habits. An oral care referral has been completed, and he has been provided with health literacy to support his health stability.

## 2023-12-18 ENCOUNTER — OFFICE VISIT (OUTPATIENT)
Dept: SURGERY | Facility: CLINIC | Age: 28
End: 2023-12-18

## 2023-12-18 VITALS
RESPIRATION RATE: 17 BRPM | WEIGHT: 228.6 LBS | HEART RATE: 75 BPM | HEIGHT: 74 IN | DIASTOLIC BLOOD PRESSURE: 60 MMHG | TEMPERATURE: 97 F | BODY MASS INDEX: 29.34 KG/M2 | OXYGEN SATURATION: 98 % | SYSTOLIC BLOOD PRESSURE: 135 MMHG

## 2023-12-18 DIAGNOSIS — I10 PRIMARY HYPERTENSION: ICD-10-CM

## 2023-12-18 DIAGNOSIS — Z00.00 ANNUAL PHYSICAL EXAM: ICD-10-CM

## 2023-12-18 DIAGNOSIS — E80.4 GILBERT'S SYNDROME: ICD-10-CM

## 2023-12-18 DIAGNOSIS — E78.5 DYSLIPIDEMIA: ICD-10-CM

## 2023-12-18 DIAGNOSIS — B20 CURRENTLY ASYMPTOMATIC HIV INFECTION, WITH HISTORY OF HIV-RELATED ILLNESS (HCC): Primary | ICD-10-CM

## 2023-12-18 NOTE — ASSESSMENT & PLAN NOTE
Not at goal.  Stressed importance of exercising for 30 minutes 5 days a week per recommended guidelines.  Stressed importance of eating a diet low in fat, carbohydrates, cholesterol, sugar intake, monitor portion control and eating more fruits and vegetables.   Follow with [facility] dietician

## 2023-12-18 NOTE — PROGRESS NOTES
ADULT ANNUAL PHYSICAL  [facility]    NAME: Jony Aggarwal Jr.  AGE: 28 y.o. SEX: male  : 1995     DATE: 2023     Assessment and Plan:     Problem List Items Addressed This Visit          Cardiovascular and Mediastinum    Primary hypertension     BP at goal.   Continue to monitor BP at home            Other    Currently asymptomatic HIV infection, with history of HIV-related illness (HCC) - Primary    Gilbert's syndrome     Stable.  Continue to monitor CMP  No need for further workup at this time         Dyslipidemia     Not at goal.  Stressed importance of exercising for 30 minutes 5 days a week per recommended guidelines.  Stressed importance of eating a diet low in fat, carbohydrates, cholesterol, sugar intake, monitor portion control and eating more fruits and vegetables.   Follow with [facility] dietician          Other Visit Diagnoses       Annual physical exam                Immunizations and preventive care screenings were discussed with patient today. Appropriate education was printed on patient's after visit summary.    Counseling:  Injury prevention: discussed safety/seat belts, safety helmets, smoke detectors, carbon dioxide detectors, and smoking near bedding or upholstery.  Sexual health: discussed sexually transmitted diseases, partner selection, use of condoms, avoidance of unintended pregnancy, and contraceptive alternatives.  Exercise: the importance of regular exercise/physical activity was discussed. Recommend exercise 3-5 times per week for at least 30 minutes.          Return in 6 months (on 2024).     Chief Complaint:   Jony presents for annual physical and for management of HIV.  PMH: HIV, HTN, Gilbert's syndrome, and obesity.  Jony reports he is doing well and has stayed healthy.  He is UTD on vaccines.  Denies any concerns.     He does not endorse any fever, chills, nausea, vomiting, cough, SOB,  diarrhea, or night sweats.  Chief Complaint   Patient presents with    Annual Exam      History of Present Illness:     Adult Annual Physical   Patient here for a comprehensive physical exam. The patient reports no problems.    Diet and Physical Activity  Diet/Nutrition: well balanced diet, frequent junk food, low fat diet, and consuming 3-5 servings of fruits/vegetables daily.   Exercise: 5-7 times a week on average and 30-60 minutes on average.      Depression Screening  PHQ-2/9 Depression Screening           General Health  Sleep: sleeps well and gets 7-8 hours of sleep on average.   Hearing: normal - bilateral.  Vision: no vision problems and most recent eye exam >1 year ago.   Dental: regular dental visits, brushes teeth twice daily, and flosses teeth occasionally.        Health  History of STDs?: no.    Advanced Care Planning  Do you have an advanced directive? no  Do you have a durable medical power of ? no     Review of Systems:     Review of Systems   Constitutional: Negative.    HENT: Negative.     Respiratory: Negative.     Cardiovascular: Negative.    Gastrointestinal: Negative.    Genitourinary: Negative.    Musculoskeletal: Negative.    Skin: Negative.    Neurological: Negative.    Psychiatric/Behavioral: Negative.        Past Medical History:     Past Medical History:   Diagnosis Date    HIV (human immunodeficiency virus infection) ([facility])       Past Surgical History:     No past surgical history on file.   Social History:     Social History     Socioeconomic History    Marital status: /Civil Union     Spouse name: None    Number of children: None    Years of education: None    Highest education level: None   Occupational History    Occupation: Unemployed   Tobacco Use    Smoking status: Never    Smokeless tobacco: Never   Vaping Use    Vaping status: Never Used   Substance and Sexual Activity    Alcohol use: Yes     Comment: Seldom    Drug use: Never    Sexual activity: Yes      Partners: Male   Other Topics Concern    None   Social History Narrative    None     Social Determinants of Health     Financial Resource Strain: Low Risk  (11/4/2022)    Overall Financial Resource Strain (CARDIA)     Difficulty of Paying Living Expenses: Not hard at all   Food Insecurity: No Food Insecurity (12/18/2023)    Hunger Vital Sign     Worried About Running Out of Food in the Last Year: Never true     Ran Out of Food in the Last Year: Never true   Transportation Needs: No Transportation Needs (11/4/2022)    PRAPARE - Transportation     Lack of Transportation (Medical): No     Lack of Transportation (Non-Medical): No   Physical Activity: Sufficiently Active (11/4/2022)    Exercise Vital Sign     Days of Exercise per Week: 5 days     Minutes of Exercise per Session: 90 min   Stress: No Stress Concern Present (11/4/2022)    [employer] - Occupational Stress Questionnaire     Feeling of Stress : Not at all   Social Connections: Moderately Integrated (11/4/2022)    Social Connection and Isolation Panel [NHANES]     Frequency of Communication with Friends and Family: More than three times a week     Frequency of Social Gatherings with Friends and Family: Three times a week     Attends Scientology Services: Never     Active Member of Clubs or Organizations: Yes     Attends Club or Organization Meetings: More than 4 times per year     Marital Status:    Intimate Partner Violence: Not At Risk (11/4/2022)    Humiliation, Afraid, Rape, and Kick questionnaire     Fear of Current or Ex-Partner: No     Emotionally Abused: No     Physically Abused: No     Sexually Abused: No   Housing Stability: Unknown (11/4/2022)    Housing Stability Vital Sign     Unable to Pay for Housing in the Last Year: No     Number of Places Lived in the Last Year: Not on file     Unstable Housing in the Last Year: No      Family History:     Family History   Problem Relation Age of Onset    Hypertension  "Paternal Grandmother       Current Medications:     Current Outpatient Medications   Medication Sig Dispense Refill    Biktarvy -25 MG tablet TAKE 1 TABLET BY MOUTH DAILY WITH BREAKFAST 90 tablet 0     No current facility-administered medications for this visit.      Allergies:     No Known Allergies   Physical Exam:     /60 (BP Location: Right arm, Patient Position: Sitting, Cuff Size: Large)   Pulse 75   Temp (!) 97 °F (36.1 °C) (Tympanic)   Resp 17   Ht 6' 2\" (1.88 m)   Wt 104 kg (228 lb 9.6 oz)   SpO2 98%   BMI 29.35 kg/m²     Physical Exam  Vitals and nursing note reviewed.   Constitutional:       General: He is not in acute distress.     Appearance: Normal appearance. He is not ill-appearing.   HENT:      Head: Normocephalic.      Right Ear: Tympanic membrane normal.      Left Ear: Tympanic membrane normal.      Mouth/Throat:      Mouth: Mucous membranes are moist.      Pharynx: Oropharynx is clear.   Eyes:      Extraocular Movements: Extraocular movements intact.      Pupils: Pupils are equal, round, and reactive to light.   Cardiovascular:      Rate and Rhythm: Normal rate and regular rhythm.      Chest Wall: PMI is not displaced.      Pulses: Normal pulses.      Heart sounds: Normal heart sounds.   Pulmonary:      Effort: Pulmonary effort is normal.      Breath sounds: Normal breath sounds.   Abdominal:      General: Bowel sounds are normal.      Palpations: Abdomen is soft.   Lymphadenopathy:      Cervical: No cervical adenopathy.   Skin:     General: Skin is warm and dry.      Capillary Refill: Capillary refill takes less than 2 seconds.   Neurological:      Mental Status: He is alert and oriented to person, place, and time.   Psychiatric:         Mood and Affect: Mood normal.         Behavior: Behavior normal.         Thought Content: Thought content normal.         Judgment: Judgment normal.          JOSE Metz   [facility]    "

## 2024-01-03 ENCOUNTER — PATIENT OUTREACH (OUTPATIENT)
Dept: SURGERY | Facility: CLINIC | Age: 29
End: 2024-01-03

## 2024-01-05 NOTE — PROGRESS NOTES
CM attempted to engage the client, but unfortunately, the did not responds, therefore CM left a voice message.

## 2024-01-09 ENCOUNTER — PATIENT OUTREACH (OUTPATIENT)
Dept: SURGERY | Facility: CLINIC | Age: 29
End: 2024-01-09

## 2024-01-10 NOTE — PROGRESS NOTES
CM contacted the client to provide a follow up with his preventive care plan. According to the client he has been compliant with his meds as directed. CM suggested him to continue proactive with all his medical care to support his well-being.

## 2024-02-01 ENCOUNTER — PATIENT OUTREACH (OUTPATIENT)
Dept: SURGERY | Facility: CLINIC | Age: 29
End: 2024-02-01

## 2024-02-01 NOTE — PROGRESS NOTES
CM attempted to engage the client to preventive care follow up, and a voice message was left due to non-response.

## 2024-02-21 ENCOUNTER — PATIENT OUTREACH (OUTPATIENT)
Dept: SURGERY | Facility: CLINIC | Age: 29
End: 2024-02-21

## 2024-02-27 ENCOUNTER — PATIENT OUTREACH (OUTPATIENT)
Dept: SURGERY | Facility: CLINIC | Age: 29
End: 2024-02-27

## 2024-02-28 ENCOUNTER — PATIENT OUTREACH (OUTPATIENT)
Dept: SURGERY | Facility: CLINIC | Age: 29
End: 2024-02-28

## 2024-02-28 NOTE — PROGRESS NOTES
CM contacted the client, who mentioned that he has a new Medical Insurance card and will submit it via e-mail. Additionally, the client reported that the meds adhere to the regimen.

## 2024-03-07 ENCOUNTER — PATIENT OUTREACH (OUTPATIENT)
Dept: SURGERY | Facility: CLINIC | Age: 29
End: 2024-03-07

## 2024-03-11 NOTE — PROGRESS NOTES
CM attempted to contact the client to provide her with a preventive care plan. Unfortunately, the client did not respond, so a voice message was left.

## 2024-03-14 ENCOUNTER — PATIENT OUTREACH (OUTPATIENT)
Dept: SURGERY | Facility: CLINIC | Age: 29
End: 2024-03-14

## 2024-03-14 NOTE — PROGRESS NOTES
CM received an email from client requesting assistance with his Rx. Cm re-directed him to the  clinician.

## 2024-03-21 ENCOUNTER — APPOINTMENT (OUTPATIENT)
Dept: LAB | Facility: CLINIC | Age: 29
End: 2024-03-21
Payer: COMMERCIAL

## 2024-03-21 LAB
ALBUMIN SERPL BCP-MCNC: 4.5 G/DL (ref 3.5–5)
ALP SERPL-CCNC: 45 U/L (ref 34–104)
ALT SERPL W P-5'-P-CCNC: 18 U/L (ref 7–52)
ANION GAP SERPL CALCULATED.3IONS-SCNC: 6 MMOL/L (ref 4–13)
AST SERPL W P-5'-P-CCNC: 22 U/L (ref 13–39)
BASOPHILS # BLD AUTO: 0.01 THOUSANDS/ÂΜL (ref 0–0.1)
BASOPHILS NFR BLD AUTO: 0 % (ref 0–1)
BILIRUB SERPL-MCNC: 1.23 MG/DL (ref 0.2–1)
BUN SERPL-MCNC: 17 MG/DL (ref 5–25)
CALCIUM SERPL-MCNC: 9.3 MG/DL (ref 8.4–10.2)
CHLORIDE SERPL-SCNC: 106 MMOL/L (ref 96–108)
CO2 SERPL-SCNC: 27 MMOL/L (ref 21–32)
CREAT SERPL-MCNC: 1.18 MG/DL (ref 0.6–1.3)
EOSINOPHIL # BLD AUTO: 0.06 THOUSAND/ÂΜL (ref 0–0.61)
EOSINOPHIL NFR BLD AUTO: 1 % (ref 0–6)
ERYTHROCYTE [DISTWIDTH] IN BLOOD BY AUTOMATED COUNT: 12.6 % (ref 11.6–15.1)
GFR SERPL CREATININE-BSD FRML MDRD: 83 ML/MIN/1.73SQ M
GLUCOSE P FAST SERPL-MCNC: 90 MG/DL (ref 65–99)
HCT VFR BLD AUTO: 46.4 % (ref 36.5–49.3)
HGB BLD-MCNC: 15.5 G/DL (ref 12–17)
IMM GRANULOCYTES # BLD AUTO: 0.02 THOUSAND/UL (ref 0–0.2)
IMM GRANULOCYTES NFR BLD AUTO: 0 % (ref 0–2)
LYMPHOCYTES # BLD AUTO: 1.87 THOUSANDS/ÂΜL (ref 0.6–4.47)
LYMPHOCYTES NFR BLD AUTO: 40 % (ref 14–44)
MCH RBC QN AUTO: 30.7 PG (ref 26.8–34.3)
MCHC RBC AUTO-ENTMCNC: 33.4 G/DL (ref 31.4–37.4)
MCV RBC AUTO: 92 FL (ref 82–98)
MONOCYTES # BLD AUTO: 0.44 THOUSAND/ÂΜL (ref 0.17–1.22)
MONOCYTES NFR BLD AUTO: 9 % (ref 4–12)
NEUTROPHILS # BLD AUTO: 2.34 THOUSANDS/ÂΜL (ref 1.85–7.62)
NEUTS SEG NFR BLD AUTO: 50 % (ref 43–75)
NRBC BLD AUTO-RTO: 0 /100 WBCS
PLATELET # BLD AUTO: 220 THOUSANDS/UL (ref 149–390)
PMV BLD AUTO: 10.2 FL (ref 8.9–12.7)
POTASSIUM SERPL-SCNC: 4.1 MMOL/L (ref 3.5–5.3)
PROT SERPL-MCNC: 7.1 G/DL (ref 6.4–8.4)
RBC # BLD AUTO: 5.05 MILLION/UL (ref 3.88–5.62)
SODIUM SERPL-SCNC: 139 MMOL/L (ref 135–147)
WBC # BLD AUTO: 4.74 THOUSAND/UL (ref 4.31–10.16)

## 2024-03-22 LAB
BASOPHILS # BLD AUTO: 0 X10E3/UL (ref 0–0.2)
BASOPHILS NFR BLD AUTO: 0 %
CD3+CD4+ CELLS # BLD: 671 /UL (ref 359–1519)
CD3+CD4+ CELLS NFR BLD: 37.3 % (ref 30.8–58.5)
CD3+CD4+ CELLS/CD3+CD8+ CLL BLD: 1.18 % (ref 0.92–3.72)
CD3+CD8+ CELLS # BLD: 567 /UL (ref 109–897)
CD3+CD8+ CELLS NFR BLD: 31.5 % (ref 12–35.5)
EOSINOPHIL # BLD AUTO: 0.1 X10E3/UL (ref 0–0.4)
EOSINOPHIL NFR BLD AUTO: 1 %
ERYTHROCYTE [DISTWIDTH] IN BLOOD BY AUTOMATED COUNT: 12.4 % (ref 11.6–15.4)
HCT VFR BLD AUTO: 41 % (ref 37.5–51)
HGB BLD-MCNC: 13.5 G/DL (ref 13–17.7)
HIV1 RNA # PLAS NAA DL=20: ABNORMAL {COPIES}/ML
IMM GRANULOCYTES # BLD: 0 X10E3/UL (ref 0–0.1)
IMM GRANULOCYTES NFR BLD: 0 %
LYMPHOCYTES # BLD AUTO: 1.8 X10E3/UL (ref 0.7–3.1)
LYMPHOCYTES NFR BLD AUTO: 36 %
MCH RBC QN AUTO: 30.5 PG (ref 26.6–33)
MCHC RBC AUTO-ENTMCNC: 32.9 G/DL (ref 31.5–35.7)
MCV RBC AUTO: 93 FL (ref 79–97)
MONOCYTES # BLD AUTO: 0.4 X10E3/UL (ref 0.1–0.9)
MONOCYTES NFR BLD AUTO: 9 %
NEUTROPHILS # BLD AUTO: 2.6 X10E3/UL (ref 1.4–7)
NEUTROPHILS NFR BLD AUTO: 54 %
PLATELET # BLD AUTO: 249 X10E3/UL (ref 150–450)
RBC # BLD AUTO: 4.43 X10E6/UL (ref 4.14–5.8)
WBC # BLD AUTO: 4.9 X10E3/UL (ref 3.4–10.8)

## 2024-03-25 ENCOUNTER — PATIENT OUTREACH (OUTPATIENT)
Dept: SURGERY | Facility: CLINIC | Age: 29
End: 2024-03-25

## 2024-03-25 NOTE — PROGRESS NOTES
CM attempted to contact the client to provide him with a preventive care plan. Unfortunately, the client did not respond, so a voice message was left.     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 The surgical margins are positive for tumor cells.

## 2024-03-28 DIAGNOSIS — Z72.89 OTHER PROBLEMS RELATED TO LIFESTYLE: ICD-10-CM

## 2024-03-28 DIAGNOSIS — B20 HIV DISEASE (HCC): Primary | ICD-10-CM

## 2024-03-28 DIAGNOSIS — I10 PRIMARY HYPERTENSION: ICD-10-CM

## 2024-03-28 DIAGNOSIS — Z13.1 SCREENING FOR DIABETES MELLITUS: ICD-10-CM

## 2024-03-28 DIAGNOSIS — Z11.1 SCREENING-PULMONARY TB: ICD-10-CM

## 2024-03-28 DIAGNOSIS — D72.89 OTHER SPECIFIED DISORDERS OF WHITE BLOOD CELLS: ICD-10-CM

## 2024-03-28 DIAGNOSIS — Z20.2 CONTACT WITH AND (SUSPECTED) EXPOSURE TO INFECTIONS WITH A PREDOMINANTLY SEXUAL MODE OF TRANSMISSION: ICD-10-CM

## 2024-03-28 DIAGNOSIS — Z11.3 ENCOUNTER FOR SCREENING FOR BACTERIAL SEXUALLY TRANSMITTED DISEASE: ICD-10-CM

## 2024-03-28 DIAGNOSIS — E78.5 DYSLIPIDEMIA: ICD-10-CM

## 2024-04-03 ENCOUNTER — OFFICE VISIT (OUTPATIENT)
Dept: SURGERY | Facility: CLINIC | Age: 29
End: 2024-04-03
Payer: COMMERCIAL

## 2024-04-03 ENCOUNTER — DOCUMENTATION (OUTPATIENT)
Dept: SURGERY | Facility: CLINIC | Age: 29
End: 2024-04-03

## 2024-04-03 VITALS
HEIGHT: 74 IN | OXYGEN SATURATION: 99 % | HEART RATE: 65 BPM | TEMPERATURE: 96.6 F | DIASTOLIC BLOOD PRESSURE: 63 MMHG | RESPIRATION RATE: 17 BRPM | BODY MASS INDEX: 30.7 KG/M2 | WEIGHT: 239.2 LBS | SYSTOLIC BLOOD PRESSURE: 152 MMHG

## 2024-04-03 DIAGNOSIS — B20 CURRENTLY ASYMPTOMATIC HIV INFECTION, WITH HISTORY OF HIV-RELATED ILLNESS (HCC): Primary | ICD-10-CM

## 2024-04-03 DIAGNOSIS — E80.4 GILBERT'S SYNDROME: ICD-10-CM

## 2024-04-03 DIAGNOSIS — I10 PRIMARY HYPERTENSION: ICD-10-CM

## 2024-04-03 PROCEDURE — 99214 OFFICE O/P EST MOD 30 MIN: CPT | Performed by: INTERNAL MEDICINE

## 2024-04-04 NOTE — ASSESSMENT & PLAN NOTE
Doing well on Biktarvy with an undetectable VL.  Pt reports 100% medication compliance on HAART.  Stressed the importance of 100% medication adherence  Monitor CD4. HIV RNA, CMP, and CBCD for virologic response and drug toxicities  Follow up with Dr. Churchill or Dr. Hernandez   HIV Counseling:    Viral Load: < 20    CD4 Count: 671      Denies side effects.  Stressed the importance of adherence.  Continue follow up in the ID clinic with Dr. Churchill or Dr. Hernandez.    Reviewed the most recent labs, including the CD4 and viral load.  Discussed the risks and benefits of treatment options, instructions for management, importance of treatment adherence, and reduction of risk factors.  Educated on possible medication side effects.    Counseled on routes of HIV transmission, including the risk of  infection.  Emphasized that viral suppression is the best method to prevent HIV transmission.  At this time the pt denies the need for HIV testing of anyone in their life.    Total encounter time was greater than 35 minutes.  Greater than 20 minutes were spent on counseling and patient education.  Pt voices understanding and agreement with treatment plan.

## 2024-04-04 NOTE — PROGRESS NOTES
[facility] Nutrition Encounter       Jony Aggarwal Jr. is a 28 y.o. male seen by RD in conjunction with ID f/u    Last nutrition encounter was on 10/4/2024 for annual assessment; Nutrition Diagnosis Problem: altered nutrition-related lab values (borderline high LDL) Related to: energy intake > energy output over time  lack of prior nutrition education As Evidenced By: abnormal labs (, CHOL 200) diet recall  patient interview     PMHx: HTN, weight gain     CD4 count:  671  Viral load:  <20  ART:   Biktarvy    Monitoring/Evaluation:  1.  BW  Wt Readings from Last 3 Encounters:   04/03/24 109 kg (239 lb 3.2 oz)   12/18/23 104 kg (228 lb 9.6 oz)   10/04/23 102 kg (225 lb 9.6 oz)     14# increase over 6 month period   BMI 30.71    2.  Lipid panel   Lab Results   Component Value Date    CHOLESTEROL 200 (H) 09/14/2023    CHOLESTEROL 152 11/16/2022     Lab Results   Component Value Date    HDL 41 09/14/2023    HDL 34 (L) 11/16/2022     Lab Results   Component Value Date    TRIG 131 09/14/2023    TRIG 98 11/16/2022     Lab Results   Component Value Date    NONHDLC 159 09/14/2023     Lab Results   Component Value Date    HGBA1C 5.4 09/14/2023    HGBA1C 5.1 11/16/2022     Lab Results   Component Value Date    GLUF 90 03/21/2024    LDLCALC 133 (H) 09/14/2023    CREATININE 1.18 03/21/2024   No new lipid panel     3.  Dietary patterns   -Reportedly cut back on fatty meats, eating more fish/chicken   -Eating lots of vegetables   -Taking 3-4 g creatine supplement most days   -Drinking lots of water     4.  Physical activity   -Exercising 4-5 days per week (cardio and weight training)       Nutrition Diagnosis  Updated to reflect weight gain     Problem: altered nutrition-related lab values (elevated lipid panel ) unintended weight gain     Related to: energy intake > energy output over time     As Evidenced By: abnormal labs (CHOL 200, ), diet recall , patient interview , and weight gain       Nutrition  Intervention/Recommendations    Nutrition education intervention: provided and reinforced previous education     Nutrition recommendations: Reinforced cholesterol lowering MNT, advised against creatine supplement due to weight gain and potential renal effects, encouraged adequate protein through dietary means     Supplement recommendations: Separate supplements at least 6 hrs from ART      Teaching Method: verbal     Person Educated: patient      Comprehension: good    Receptivity: good    Expected compliance: good      Collaboration/referral of nutrition care:    Nutrition f/u as needed as he presents to PCP appts       Goals:  1.  Lipids WNL   Continue to support, no new lipid panel   -Limit servings of high fat meats, replace with salmon or chicken   -Continue cholesterol lowering MNT  -Balanced diet, and continue with exercise routine      2.  Avoid weight gain   Not meeting  -Limit servings of high fat meats, replace with salmon or chicken   -Continue cholesterol lowering MNT  -Balanced diet, and continue with exercise routine   -Limit creatine       Monitoring/Evaluation:  BW  Dietary patterns   Exercise   Lipid panel       Visit Summary  RD met with Jony Aggarwal Jr. During ID clinic appt.      Jony Aggarwal Jr. With continuing weight gain, 14# increase over 6 month period.  Pt reports eating well balanced diet including lots of vegetables and lean proteins, and has been exercising.  He has been taking creatine supplement to help with muscle gain; discussed protein sources in the diet and encouraged adequate protein intake through diet.  Advised against creatine supplement due to potential for weight gain and adverse effects on kidney function.  Advised that should he decide to continue, separate at last 6 hrs from ART.  Reinforced cholesterol lowering MNT discussed at previous encounter.      Continue to check in with pt as he presents to clinic appts, as needed and as receptive.      Shirlene Gaviria,  MS, RD, NEGINN

## 2024-04-04 NOTE — PROGRESS NOTES
Assessment/Plan:    Currently asymptomatic HIV infection, with history of HIV-related illness (HCC)  Doing well on Biktarvy with an undetectable VL.  Pt reports 100% medication compliance on HAART.  Stressed the importance of 100% medication adherence  Monitor CD4. HIV RNA, CMP, and CBCD for virologic response and drug toxicities  Follow up with Dr. Churchill or Dr. Hernandez   HIV Counseling:    Viral Load: < 20    CD4 Count: 671      Denies side effects.  Stressed the importance of adherence.  Continue follow up in the ID clinic with Dr. Churchill or Dr. Hernandez.    Reviewed the most recent labs, including the CD4 and viral load.  Discussed the risks and benefits of treatment options, instructions for management, importance of treatment adherence, and reduction of risk factors.  Educated on possible medication side effects.    Counseled on routes of HIV transmission, including the risk of  infection.  Emphasized that viral suppression is the best method to prevent HIV transmission.  At this time the pt denies the need for HIV testing of anyone in their life.    Total encounter time was greater than 35 minutes.  Greater than 20 minutes were spent on counseling and patient education.  Pt voices understanding and agreement with treatment plan.        Proteinuria  Last UA: no protein.  Recheck UA    Primary hypertension  BP at baseline today: 133/62.  Recently gained 10 lb through gym and using creatinine.  Discussed risks of poor BP control  For now, continue with life style management  Continue going to gym  Recommend reducing or stopping creatinine altogether since consume enough protein with dietary intake  Recommend weight loss  Monitor BP at home and reports to office in next 2 weeks   No HTN medication for now  UA bland       Diagnoses and all orders for this visit:    Currently asymptomatic HIV infection, with history of HIV-related illness (HCC)    Proteinuria, unspecified type    Primary  "hypertension    Gastroesophageal reflux disease without esophagitis          Subjective:      Patient ID: Jony Aggarwal Jr. is a 28 y.o. male.    HPI  Jony presents for poorly controlled BP and for management of HIV.  BP at ID visit on Wednesday was 152/63 and has been elevated in the past.   Jony is asymptomatic with the elevated BP.  Denies any HA or blurry vision.  He has gained 10 lbs and likes how his body looks with going to the gym. He does use creatinine supplement.  Does not have any other concerns.    He does not endorse any fever, chills, nausea, vomiting, cough, SOB, diarrhea, or night sweats.      The following portions of the patient's history were reviewed and updated as appropriate: allergies, current medications, past family history, past medical history, past social history, and problem list.    Review of Systems   Constitutional: Negative.    HENT: Negative.     Respiratory: Negative.     Cardiovascular: Negative.    Gastrointestinal: Negative.    Genitourinary: Negative.    Musculoskeletal: Negative.    Neurological: Negative.    Psychiatric/Behavioral: Negative.           Objective:      /62 (BP Location: Right arm, Patient Position: Sitting, Cuff Size: Large)   Pulse 71   Temp (!) 97 °F (36.1 °C) (Tympanic)   Resp 17   Ht 6' 2\" (1.88 m)   Wt 108 kg (238 lb)   SpO2 100%   BMI 30.56 kg/m²          Physical Exam  Vitals and nursing note reviewed.   Constitutional:       General: He is not in acute distress.     Appearance: He is not ill-appearing.   Cardiovascular:      Rate and Rhythm: Normal rate and regular rhythm.      Chest Wall: PMI is not displaced.      Pulses: Normal pulses.      Heart sounds: Normal heart sounds.   Pulmonary:      Effort: Pulmonary effort is normal.      Breath sounds: Normal breath sounds.   Abdominal:      General: Bowel sounds are normal.      Palpations: Abdomen is soft.   Musculoskeletal:         General: Normal range of motion.   Skin:     " General: Skin is warm and dry.      Capillary Refill: Capillary refill takes less than 2 seconds.   Neurological:      Mental Status: He is oriented to person, place, and time.   Psychiatric:         Mood and Affect: Mood normal.         Behavior: Behavior normal.         Thought Content: Thought content normal.         Judgment: Judgment normal.

## 2024-04-05 ENCOUNTER — OFFICE VISIT (OUTPATIENT)
Dept: SURGERY | Facility: CLINIC | Age: 29
End: 2024-04-05
Payer: COMMERCIAL

## 2024-04-05 ENCOUNTER — DOCUMENTATION (OUTPATIENT)
Dept: SURGERY | Facility: CLINIC | Age: 29
End: 2024-04-05

## 2024-04-05 VITALS
SYSTOLIC BLOOD PRESSURE: 133 MMHG | WEIGHT: 238 LBS | BODY MASS INDEX: 30.54 KG/M2 | HEIGHT: 74 IN | DIASTOLIC BLOOD PRESSURE: 62 MMHG | RESPIRATION RATE: 17 BRPM | TEMPERATURE: 97 F | OXYGEN SATURATION: 100 % | HEART RATE: 71 BPM

## 2024-04-05 DIAGNOSIS — B20 CURRENTLY ASYMPTOMATIC HIV INFECTION, WITH HISTORY OF HIV-RELATED ILLNESS (HCC): Primary | ICD-10-CM

## 2024-04-05 DIAGNOSIS — K21.9 GASTROESOPHAGEAL REFLUX DISEASE WITHOUT ESOPHAGITIS: ICD-10-CM

## 2024-04-05 DIAGNOSIS — I10 PRIMARY HYPERTENSION: ICD-10-CM

## 2024-04-05 DIAGNOSIS — R80.9 PROTEINURIA, UNSPECIFIED TYPE: ICD-10-CM

## 2024-04-05 PROCEDURE — 99214 OFFICE O/P EST MOD 30 MIN: CPT | Performed by: NURSE PRACTITIONER

## 2024-04-05 NOTE — PROGRESS NOTES
" Pastoral Care Progress Note    2024  Patient: Jony Aggarwal Jr. : 1995  Encounter Date & Time: 2024   MRN: [id number]        The  met with Mr. Aggarwal for continued care and support.  Mr. Aggarwal smiled and shared he was doing really \"good\". He expressed excitement about the spring and looking forward to warmer weather.  He advised he enjoys the longer days and sunshine.  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  Mr. Aggarwal says he prays every night and feels close to God and is living his purpose. The  celebrated with MR. Aggarwal his excitement about the spring and work.  The  advised she is in [place] close to his job on Monday and Tuesday and he could reach out if he needed anything.  Further support as needed.              Chaplaincy Interventions Utilized:   Empowerment: Encouraged focus on present    Exploration: Explored hope and Explored spiritual needs & resources    Relationship Building: Cultivated a relationship of care and support and Listened empathically    Chaplaincy Outcomes Achieved:  Expressed gratitude and Identified meaningful connections    Spiritual Coping Strategies Utilized:   Spiritual practices, Spiritual comfort, and Spiritual meaning            "

## 2024-04-05 NOTE — ASSESSMENT & PLAN NOTE
BP at baseline today: 133/62.  Recently gained 10 lb through gym and using creatinine.  Discussed risks of poor BP control  For now, continue with life style management  Continue going to gym  Recommend reducing or stopping creatinine altogether since consume enough protein with dietary intake  Recommend weight loss  Monitor BP at home and reports to office in next 2 weeks   No HTN medication for now  UA bland

## 2024-04-09 ENCOUNTER — PATIENT OUTREACH (OUTPATIENT)
Dept: SURGERY | Facility: CLINIC | Age: 29
End: 2024-04-09

## 2024-04-19 NOTE — PROGRESS NOTES
After attempting to contact the client for a preventive care follow-up, a voice message was left due to non-response.

## 2024-04-29 ENCOUNTER — PATIENT OUTREACH (OUTPATIENT)
Dept: SURGERY | Facility: CLINIC | Age: 29
End: 2024-04-29

## 2024-04-30 NOTE — PROGRESS NOTES
APOORVA contacted the client to provide a preventive care follow-up, and the client reported that he has been compliant with all med regimens as directed by the MD.

## 2024-05-08 ENCOUNTER — PATIENT OUTREACH (OUTPATIENT)
Dept: SURGERY | Facility: CLINIC | Age: 29
End: 2024-05-08

## 2024-05-09 DIAGNOSIS — B20 CURRENTLY ASYMPTOMATIC HIV INFECTION, WITH HISTORY OF HIV-RELATED ILLNESS (HCC): ICD-10-CM

## 2024-05-09 RX ORDER — BICTEGRAVIR SODIUM, EMTRICITABINE, AND TENOFOVIR ALAFENAMIDE FUMARATE 50; 200; 25 MG/1; MG/1; MG/1
1 TABLET ORAL
Qty: 90 TABLET | Refills: 0 | Status: SHIPPED | OUTPATIENT
Start: 2024-05-09

## 2024-05-29 ENCOUNTER — PATIENT OUTREACH (OUTPATIENT)
Dept: SURGERY | Facility: CLINIC | Age: 29
End: 2024-05-29

## 2024-05-31 DIAGNOSIS — B20 CURRENTLY ASYMPTOMATIC HIV INFECTION, WITH HISTORY OF HIV-RELATED ILLNESS (HCC): ICD-10-CM

## 2024-06-03 RX ORDER — BICTEGRAVIR SODIUM, EMTRICITABINE, AND TENOFOVIR ALAFENAMIDE FUMARATE 50; 200; 25 MG/1; MG/1; MG/1
1 TABLET ORAL
Qty: 90 TABLET | Refills: 1 | Status: SHIPPED | OUTPATIENT
Start: 2024-06-03

## 2024-06-04 ENCOUNTER — PATIENT OUTREACH (OUTPATIENT)
Dept: SURGERY | Facility: CLINIC | Age: 29
End: 2024-06-04

## 2024-06-14 ENCOUNTER — PATIENT OUTREACH (OUTPATIENT)
Dept: SURGERY | Facility: CLINIC | Age: 29
End: 2024-06-14

## 2024-06-18 ENCOUNTER — PATIENT OUTREACH (OUTPATIENT)
Dept: SURGERY | Facility: CLINIC | Age: 29
End: 2024-06-18

## 2024-06-24 NOTE — PROGRESS NOTES
After attempting to engage the client to follow up on his medical care adherence, voice and text messages were left on the client's phone voicemail.

## 2024-06-28 ENCOUNTER — APPOINTMENT (OUTPATIENT)
Dept: LAB | Facility: CLINIC | Age: 29
End: 2024-06-28
Payer: COMMERCIAL

## 2024-06-28 LAB
ALBUMIN SERPL BCG-MCNC: 4.3 G/DL (ref 3.5–5)
ALP SERPL-CCNC: 52 U/L (ref 34–104)
ALT SERPL W P-5'-P-CCNC: 23 U/L (ref 7–52)
ANION GAP SERPL CALCULATED.3IONS-SCNC: 9 MMOL/L (ref 4–13)
AST SERPL W P-5'-P-CCNC: 27 U/L (ref 13–39)
BASOPHILS # BLD AUTO: 0.01 THOUSANDS/ÂΜL (ref 0–0.1)
BASOPHILS NFR BLD AUTO: 0 % (ref 0–1)
BILIRUB SERPL-MCNC: 1.01 MG/DL (ref 0.2–1)
BILIRUB UR QL STRIP: NEGATIVE
BUN SERPL-MCNC: 12 MG/DL (ref 5–25)
CALCIUM SERPL-MCNC: 9.4 MG/DL (ref 8.4–10.2)
CHLORIDE SERPL-SCNC: 107 MMOL/L (ref 96–108)
CHOLEST SERPL-MCNC: 162 MG/DL
CLARITY UR: CLEAR
CO2 SERPL-SCNC: 25 MMOL/L (ref 21–32)
COLOR UR: COLORLESS
CREAT SERPL-MCNC: 1.02 MG/DL (ref 0.6–1.3)
EOSINOPHIL # BLD AUTO: 0.13 THOUSAND/ÂΜL (ref 0–0.61)
EOSINOPHIL NFR BLD AUTO: 2 % (ref 0–6)
ERYTHROCYTE [DISTWIDTH] IN BLOOD BY AUTOMATED COUNT: 12.5 % (ref 11.6–15.1)
EST. AVERAGE GLUCOSE BLD GHB EST-MCNC: 105 MG/DL
GFR SERPL CREATININE-BSD FRML MDRD: 99 ML/MIN/1.73SQ M
GLUCOSE P FAST SERPL-MCNC: 89 MG/DL (ref 65–99)
GLUCOSE UR STRIP-MCNC: NEGATIVE MG/DL
HBA1C MFR BLD: 5.3 %
HCT VFR BLD AUTO: 45.6 % (ref 36.5–49.3)
HDLC SERPL-MCNC: 40 MG/DL
HGB BLD-MCNC: 15.4 G/DL (ref 12–17)
HGB UR QL STRIP.AUTO: NEGATIVE
IMM GRANULOCYTES # BLD AUTO: 0.02 THOUSAND/UL (ref 0–0.2)
IMM GRANULOCYTES NFR BLD AUTO: 0 % (ref 0–2)
KETONES UR STRIP-MCNC: NEGATIVE MG/DL
LDLC SERPL CALC-MCNC: 100 MG/DL (ref 0–100)
LEUKOCYTE ESTERASE UR QL STRIP: NEGATIVE
LYMPHOCYTES # BLD AUTO: 1.72 THOUSANDS/ÂΜL (ref 0.6–4.47)
LYMPHOCYTES NFR BLD AUTO: 29 % (ref 14–44)
MCH RBC QN AUTO: 30.7 PG (ref 26.8–34.3)
MCHC RBC AUTO-ENTMCNC: 33.8 G/DL (ref 31.4–37.4)
MCV RBC AUTO: 91 FL (ref 82–98)
MONOCYTES # BLD AUTO: 0.47 THOUSAND/ÂΜL (ref 0.17–1.22)
MONOCYTES NFR BLD AUTO: 8 % (ref 4–12)
NEUTROPHILS # BLD AUTO: 3.53 THOUSANDS/ÂΜL (ref 1.85–7.62)
NEUTS SEG NFR BLD AUTO: 61 % (ref 43–75)
NITRITE UR QL STRIP: NEGATIVE
NONHDLC SERPL-MCNC: 122 MG/DL
NRBC BLD AUTO-RTO: 0 /100 WBCS
PH UR STRIP.AUTO: 6.5 [PH]
PLATELET # BLD AUTO: 207 THOUSANDS/UL (ref 149–390)
PMV BLD AUTO: 10.4 FL (ref 8.9–12.7)
POTASSIUM SERPL-SCNC: 3.9 MMOL/L (ref 3.5–5.3)
PROT SERPL-MCNC: 7 G/DL (ref 6.4–8.4)
PROT UR STRIP-MCNC: NEGATIVE MG/DL
RBC # BLD AUTO: 5.01 MILLION/UL (ref 3.88–5.62)
SODIUM SERPL-SCNC: 141 MMOL/L (ref 135–147)
SP GR UR STRIP.AUTO: 1.01 (ref 1–1.03)
TREPONEMA PALLIDUM IGG+IGM AB [PRESENCE] IN SERUM OR PLASMA BY IMMUNOASSAY: NORMAL
TRIGL SERPL-MCNC: 110 MG/DL
UROBILINOGEN UR STRIP-ACNC: <2 MG/DL
WBC # BLD AUTO: 5.88 THOUSAND/UL (ref 4.31–10.16)

## 2024-06-29 LAB
BASOPHILS # BLD AUTO: 0 X10E3/UL (ref 0–0.2)
BASOPHILS NFR BLD AUTO: 0 %
CD3+CD4+ CELLS # BLD: 642 /UL (ref 359–1519)
CD3+CD4+ CELLS NFR BLD: 40.1 % (ref 30.8–58.5)
EOSINOPHIL # BLD AUTO: 0.1 X10E3/UL (ref 0–0.4)
EOSINOPHIL NFR BLD AUTO: 2 %
ERYTHROCYTE [DISTWIDTH] IN BLOOD BY AUTOMATED COUNT: 12.5 % (ref 11.6–15.4)
GAMMA INTERFERON BACKGROUND BLD IA-ACNC: 0.12 IU/ML
HCT VFR BLD AUTO: 46.2 % (ref 37.5–51)
HCV AB SER QL: NORMAL
HGB BLD-MCNC: 15.3 G/DL (ref 13–17.7)
IMM GRANULOCYTES # BLD: 0 X10E3/UL (ref 0–0.1)
IMM GRANULOCYTES NFR BLD: 0 %
LYMPHOCYTES # BLD AUTO: 1.6 X10E3/UL (ref 0.7–3.1)
LYMPHOCYTES NFR BLD AUTO: 28 %
M TB IFN-G BLD-IMP: NEGATIVE
M TB IFN-G CD4+ BCKGRND COR BLD-ACNC: 0.02 IU/ML
M TB IFN-G CD4+ BCKGRND COR BLD-ACNC: 0.04 IU/ML
MCH RBC QN AUTO: 30.6 PG (ref 26.6–33)
MCHC RBC AUTO-ENTMCNC: 33.1 G/DL (ref 31.5–35.7)
MCV RBC AUTO: 92 FL (ref 79–97)
MITOGEN IGNF BCKGRD COR BLD-ACNC: 9.88 IU/ML
MONOCYTES # BLD AUTO: 0.5 X10E3/UL (ref 0.1–0.9)
MONOCYTES NFR BLD AUTO: 8 %
NEUTROPHILS # BLD AUTO: 3.6 X10E3/UL (ref 1.4–7)
NEUTROPHILS NFR BLD AUTO: 62 %
PLATELET # BLD AUTO: 210 X10E3/UL (ref 150–450)
RBC # BLD AUTO: 5 X10E6/UL (ref 4.14–5.8)
WBC # BLD AUTO: 5.9 X10E3/UL (ref 3.4–10.8)

## 2024-07-01 ENCOUNTER — PATIENT OUTREACH (OUTPATIENT)
Dept: SURGERY | Facility: CLINIC | Age: 29
End: 2024-07-01

## 2024-07-01 LAB
C TRACH DNA SPEC QL NAA+PROBE: NEGATIVE
HIV1 RNA # PLAS NAA DL=20: NOT DETECTED {COPIES}/ML
N GONORRHOEA DNA SPEC QL NAA+PROBE: NEGATIVE

## 2024-07-02 ENCOUNTER — OFFICE VISIT (OUTPATIENT)
Dept: SURGERY | Facility: CLINIC | Age: 29
End: 2024-07-02
Payer: COMMERCIAL

## 2024-07-02 ENCOUNTER — PATIENT OUTREACH (OUTPATIENT)
Dept: SURGERY | Facility: CLINIC | Age: 29
End: 2024-07-02

## 2024-07-02 ENCOUNTER — TREATMENT (OUTPATIENT)
Dept: SURGERY | Facility: CLINIC | Age: 29
End: 2024-07-02

## 2024-07-02 VITALS
DIASTOLIC BLOOD PRESSURE: 84 MMHG | SYSTOLIC BLOOD PRESSURE: 110 MMHG | BODY MASS INDEX: 29.59 KG/M2 | OXYGEN SATURATION: 99 % | HEIGHT: 74 IN | RESPIRATION RATE: 18 BRPM | TEMPERATURE: 98.2 F | HEART RATE: 75 BPM | WEIGHT: 230.6 LBS

## 2024-07-02 DIAGNOSIS — E78.5 DYSLIPIDEMIA: ICD-10-CM

## 2024-07-02 DIAGNOSIS — B20 CURRENTLY ASYMPTOMATIC HIV INFECTION, WITH HISTORY OF HIV-RELATED ILLNESS (HCC): Primary | ICD-10-CM

## 2024-07-02 DIAGNOSIS — F32.A DEPRESSION, UNSPECIFIED DEPRESSION TYPE: Primary | ICD-10-CM

## 2024-07-02 DIAGNOSIS — E80.4 GILBERT'S SYNDROME: ICD-10-CM

## 2024-07-02 DIAGNOSIS — I10 PRIMARY HYPERTENSION: ICD-10-CM

## 2024-07-02 DIAGNOSIS — R09.81 NASAL CONGESTION: ICD-10-CM

## 2024-07-02 PROCEDURE — 99395 PREV VISIT EST AGE 18-39: CPT | Performed by: NURSE PRACTITIONER

## 2024-07-02 NOTE — PROGRESS NOTES
Assessment/Plan:     [facility]     Today patient present with No chief complaint on file.    Patient would likely benefit from***  Consider/focus/continue ***  Stage of change: {STAGE OF CHANGE (Optional):23829}  Plan/ Behavioral Recommendations: ***      There are no diagnoses linked to this encounter.      Subjective:     Patient ID: Jony Aggarwal Jr. is a 28 y.o. male.    HPI    History of Present Illness:      Patient is being seen for annual behavioral health assessment.   [facility]    [unfilled]       Review of Systems      Objective:     Physical Exam      [facility]    Orientation     Person: {YES/NO:55132}    Place: {YES/NO:58902}    Time: {YES/NO:20200}    Appearance    Well Developed: {YES/NO:25061} {WELL DEVELOPED (Optional):70712}    Uncomfortable: {YES/NO:34304}    Normal Body Odor: {YES/NO:80339}    Smells of Feces: {YES/NO:43838}    Smells of Urine: {YES/NO:24968}    Disheveled: {YES/NO:18558}    Well Nourished: {YES/NO:43560} {WELL NURISHED  (Optional):74484}    Grooming Unkempt: {YES/NO:64105}    Poor Eye Contact: {YES/NO:26482}    Hirsute: {YES/NO:79077}    Looks Tired: {YES/NO:69129}    Acutely Exhausted: {YES/NO:03399}{ACUTELY EXH (Optional):36265}    Mood and Affect:     Appropriate: {YES/NO:66599}    Euthymic{YES/NO:55448}    Irritable: {YES/NO:42606}    Angry: {YES/NO:27888}    Anxious: {YES/NO:43471}    Depressed:{YES/NO:22148}    Blunted:{YES/NO:87257}    Labile: {YES/NO:36579}    Restricted: {YES/NO:90631}    Harm to Self or Others: ***    Substance Abuse: ***

## 2024-07-02 NOTE — PROGRESS NOTES
Ambulatory Visit  Name: Jony Aggarwal Jr.      : 1995      MRN: [id number]  Encounter Provider: JOSE Metz  Encounter Date: 2024   Encounter department: ASC AT [facility]    Assessment & Plan   1. Currently asymptomatic HIV infection, with history of HIV-related illness (HCC)  Assessment & Plan:  Doing well on Biktarvy with an undetectable VL.  Pt reports 100% medication compliance on HAART.  Stressed the importance of 100% medication adherence  Monitor CD4. HIV RNA, CMP, and CBCD for virologic response and drug toxicities  Follow up with Dr. Churchill or Dr. Hernandez     HIV Counseling: not detected     Viral Load: 642    CD4 Count:      Denies side effects.  Stressed the importance of adherence.  Continue follow up in the ID clinic with Dr. Churchill or Dr. Hernandez.    Reviewed the most recent labs, including the CD4 and viral load.  Discussed the risks and benefits of treatment options, instructions for management, importance of treatment adherence, and reduction of risk factors.  Educated on possible medication side effects.    Counseled on routes of HIV transmission, including the risk of  infection.  Emphasized that viral suppression is the best method to prevent HIV transmission.  At this time the pt denies the need for HIV testing of anyone in their life.    Total encounter time was greater than 35 minutes.  Greater than 20 minutes were spent on counseling and patient education.  Pt voices understanding and agreement with treatment plan.      2. Primary hypertension  Assessment & Plan:  BP Suboptimal control on left arm with machine.  Manual BP check in right arm was well controlled 110/84.  BP from home 116/74.  Continue to monitor BP at home for next 2 weeks and send to office   Continue to monitor on meds  3. Gilbert's syndrome  Assessment & Plan:  Continue to monitor T. Bili now 1.01; lowest reading  Most likely [place] syndrome  4. Dyslipidemia  Assessment &  "Plan:  Improved.  Continue to monitor lipid panel  Continue life style modifications  5. Nasal congestion  Assessment & Plan:  Most likely viral given recent travel.  No signs of infection.  Does not wish to start nasal spray or loratadine at this time  Continue to conservation treatment  Honey and tea      History of Present Illness   Jony Aggarwal Jr. is a 28 y.o. male who presents for 6 month follow up visit and for management of HIV.  Jony reports he recently traveled to [place] and came back with sore throat, nasal congestion, post nasal gtt, and HA's.  He also reports having anterior/posterior mid chest that lasted for about little bit of time and woke up and then fell asleep.  BP at home 116/74.  Right shoulder/bicep pain depending on movement and b/l knees.      He does not endorse any fever, chills, nausea, vomiting, cough, SOB, diarrhea, or night sweats.  Pt is [place] speaking only and  was present during the entire visit.      Review of Systems   Constitutional: Negative.    HENT:  Positive for congestion, postnasal drip, rhinorrhea and sore throat. Negative for sinus pressure and sinus pain.    Respiratory: Negative.     Cardiovascular: Negative.    Gastrointestinal: Negative.    Musculoskeletal: Negative.    Neurological: Negative.    Psychiatric/Behavioral: Negative.       Current Outpatient Medications on File Prior to Visit   Medication Sig Dispense Refill    bictegravir-emtricitab-tenofovir alafenamide (Biktarvy) -25 MG tablet TAKE 1 TABLET BY MOUTH DAILY WITH BREAKFAST 90 tablet 1     No current facility-administered medications on file prior to visit.      Objective     /84 (BP Location: Right arm, Patient Position: Sitting, Cuff Size: Large)   Pulse 75   Temp 98.2 °F (36.8 °C) (Tympanic)   Resp 18   Ht 6' 2\" (1.88 m)   Wt 105 kg (230 lb 9.6 oz)   SpO2 99%   BMI 29.61 kg/m²     Physical Exam  Vitals and nursing note reviewed.   Constitutional:       " Appearance: Normal appearance.   HENT:      Right Ear: Tympanic membrane normal.      Left Ear: Tympanic membrane normal.      Nose: Congestion present.      Mouth/Throat:      Mouth: Mucous membranes are moist.      Pharynx: Oropharynx is clear.   Cardiovascular:      Rate and Rhythm: Normal rate and regular rhythm.      Chest Wall: PMI is not displaced.      Pulses: Normal pulses.      Heart sounds: Normal heart sounds.   Pulmonary:      Effort: Pulmonary effort is normal.      Breath sounds: Normal breath sounds.   Abdominal:      General: Bowel sounds are normal.      Palpations: Abdomen is soft.   Musculoskeletal:         General: Normal range of motion.   Lymphadenopathy:      Cervical: No cervical adenopathy.   Skin:     General: Skin is warm and dry.      Capillary Refill: Capillary refill takes less than 2 seconds.   Neurological:      Mental Status: He is alert and oriented to person, place, and time.   Psychiatric:         Mood and Affect: Mood normal.         Behavior: Behavior normal.         Thought Content: Thought content normal.         Judgment: Judgment normal.

## 2024-07-02 NOTE — PROGRESS NOTES
The Client and CM completed the EPIC Assessment. The Client responded to all assessment questions and reported good knowledge of HIV. The Client can perform all ADLs.     The Client has a steady income, housing, transportation, and medical coverage. The Client needs occasional assistance with forms and applications.     The Client is in a monogamous relationship and denies any substance abuse disorder. The Client reports no dental problems at the time. The Client's goals are to continue practicing risk reduction behavior and maintain health stability through medical care regimen and meds adherence.

## 2024-07-02 NOTE — ASSESSMENT & PLAN NOTE
BP Suboptimal control on left arm with machine.  Manual BP check in right arm was well controlled 110/84.  BP from home 116/74.  Continue to monitor BP at home for next 2 weeks and send to office   Continue to monitor on meds

## 2024-07-02 NOTE — ASSESSMENT & PLAN NOTE
Most likely viral given recent travel.  No signs of infection.  Does not wish to start nasal spray or loratadine at this time  Continue to conservation treatment  Honey and tea

## 2024-07-02 NOTE — PROGRESS NOTES
Data: Pt completed the PHQ-9 screening within his PCP appointment, yet [facility] couldn't discuss the results at the time due to conflicting support group. Pt scored was (PHQ-9=0) as a display of minimal depressive traits without SI or HI. Results will be discussed with pt to address multidimensional stability on a phone f/u, or direct pt contact.

## 2024-07-02 NOTE — ASSESSMENT & PLAN NOTE
Doing well on Biktarvy with an undetectable VL.  Pt reports 100% medication compliance on HAART.  Stressed the importance of 100% medication adherence  Monitor CD4. HIV RNA, CMP, and CBCD for virologic response and drug toxicities  Follow up with Dr. Churchill or Dr. Hernandez     HIV Counseling: not detected     Viral Load: 642    CD4 Count:      Denies side effects.  Stressed the importance of adherence.  Continue follow up in the ID clinic with Dr. Churchill or Dr. Hernandez.    Reviewed the most recent labs, including the CD4 and viral load.  Discussed the risks and benefits of treatment options, instructions for management, importance of treatment adherence, and reduction of risk factors.  Educated on possible medication side effects.    Counseled on routes of HIV transmission, including the risk of  infection.  Emphasized that viral suppression is the best method to prevent HIV transmission.  At this time the pt denies the need for HIV testing of anyone in their life.    Total encounter time was greater than 35 minutes.  Greater than 20 minutes were spent on counseling and patient education.  Pt voices understanding and agreement with treatment plan.

## 2024-07-17 ENCOUNTER — PATIENT OUTREACH (OUTPATIENT)
Dept: SURGERY | Facility: CLINIC | Age: 29
End: 2024-07-17

## 2024-08-02 ENCOUNTER — PATIENT OUTREACH (OUTPATIENT)
Dept: SURGERY | Facility: CLINIC | Age: 29
End: 2024-08-02

## 2024-08-07 ENCOUNTER — PATIENT OUTREACH (OUTPATIENT)
Dept: SURGERY | Facility: CLINIC | Age: 29
End: 2024-08-07

## 2024-08-09 ENCOUNTER — PATIENT OUTREACH (OUTPATIENT)
Dept: SURGERY | Facility: CLINIC | Age: 29
End: 2024-08-09

## 2024-08-12 NOTE — PROGRESS NOTES
CM sent the August 2024 Wellness letter to the client's home address to support the client's well-being.

## 2024-08-15 ENCOUNTER — PATIENT OUTREACH (OUTPATIENT)
Dept: SURGERY | Facility: CLINIC | Age: 29
End: 2024-08-15

## 2024-08-16 NOTE — PROGRESS NOTES
CM attempted to contact the client to provide him with a preventive care plan. Unfortunately, the client did not respond, so a voice message was left.

## 2024-08-26 ENCOUNTER — PATIENT OUTREACH (OUTPATIENT)
Dept: SURGERY | Facility: CLINIC | Age: 29
End: 2024-08-26

## 2024-08-26 NOTE — PROGRESS NOTES
CM texted the client that the CAB meeting agenda had been canceled until further notice. The client thanked CM for the update.

## 2024-08-30 ENCOUNTER — PATIENT OUTREACH (OUTPATIENT)
Dept: SURGERY | Facility: CLINIC | Age: 29
End: 2024-08-30

## 2024-09-12 ENCOUNTER — PATIENT OUTREACH (OUTPATIENT)
Dept: SURGERY | Facility: CLINIC | Age: 29
End: 2024-09-12

## 2024-09-17 NOTE — PROGRESS NOTES
CM contacted the client to follow up on his preventive care plan. The client reported compliance with medical care and medication adherence.

## 2024-09-19 ENCOUNTER — PATIENT OUTREACH (OUTPATIENT)
Dept: SURGERY | Facility: CLINIC | Age: 29
End: 2024-09-19

## 2024-09-24 ENCOUNTER — TELEPHONE (OUTPATIENT)
Dept: SURGERY | Facility: CLINIC | Age: 29
End: 2024-09-24

## 2024-09-24 DIAGNOSIS — I10 PRIMARY HYPERTENSION: ICD-10-CM

## 2024-09-24 DIAGNOSIS — Z20.2 CONTACT WITH AND (SUSPECTED) EXPOSURE TO INFECTIONS WITH A PREDOMINANTLY SEXUAL MODE OF TRANSMISSION: ICD-10-CM

## 2024-09-24 DIAGNOSIS — Z11.1 SCREENING-PULMONARY TB: ICD-10-CM

## 2024-09-24 DIAGNOSIS — E78.5 DYSLIPIDEMIA: ICD-10-CM

## 2024-09-24 DIAGNOSIS — Z72.89 OTHER PROBLEMS RELATED TO LIFESTYLE: ICD-10-CM

## 2024-09-24 DIAGNOSIS — Z11.3 ENCOUNTER FOR SCREENING FOR BACTERIAL SEXUALLY TRANSMITTED DISEASE: ICD-10-CM

## 2024-09-24 DIAGNOSIS — Z13.1 SCREENING FOR DIABETES MELLITUS: ICD-10-CM

## 2024-09-24 DIAGNOSIS — B20 HIV DISEASE (HCC): Primary | ICD-10-CM

## 2024-09-24 DIAGNOSIS — D72.89 OTHER SPECIFIED DISORDERS OF WHITE BLOOD CELLS: ICD-10-CM

## 2024-09-25 ENCOUNTER — TELEPHONE (OUTPATIENT)
Dept: SURGERY | Facility: CLINIC | Age: 29
End: 2024-09-25

## 2024-09-25 DIAGNOSIS — B20 CURRENTLY ASYMPTOMATIC HIV INFECTION, WITH HISTORY OF HIV-RELATED ILLNESS (HCC): ICD-10-CM

## 2024-09-25 NOTE — TELEPHONE ENCOUNTER
Spoke with patient to remind him to get labs done for ID charly on 10/2 and patient asked if he can r/s due to work schedule. Patient r/s for 10/9 at 5:30pm.

## 2024-09-25 NOTE — PROGRESS NOTES
CM sent the [employer] flyer to the client's home address to support the client's socialization well-being.

## 2024-09-26 RX ORDER — BICTEGRAVIR SODIUM, EMTRICITABINE, AND TENOFOVIR ALAFENAMIDE FUMARATE 50; 200; 25 MG/1; MG/1; MG/1
1 TABLET ORAL
Qty: 90 TABLET | Refills: 0 | Status: SHIPPED | OUTPATIENT
Start: 2024-09-26

## 2024-09-27 ENCOUNTER — PATIENT OUTREACH (OUTPATIENT)
Dept: SURGERY | Facility: CLINIC | Age: 29
End: 2024-09-27

## 2024-10-03 ENCOUNTER — APPOINTMENT (OUTPATIENT)
Dept: LAB | Facility: CLINIC | Age: 29
End: 2024-10-03
Payer: COMMERCIAL

## 2024-10-03 LAB
ALBUMIN SERPL BCG-MCNC: 4.5 G/DL (ref 3.5–5)
ALP SERPL-CCNC: 54 U/L (ref 34–104)
ALT SERPL W P-5'-P-CCNC: 17 U/L (ref 7–52)
ANION GAP SERPL CALCULATED.3IONS-SCNC: 10 MMOL/L (ref 4–13)
AST SERPL W P-5'-P-CCNC: 18 U/L (ref 13–39)
BASOPHILS # BLD AUTO: 0.01 THOUSANDS/ÂΜL (ref 0–0.1)
BASOPHILS NFR BLD AUTO: 0 % (ref 0–1)
BILIRUB SERPL-MCNC: 1.37 MG/DL (ref 0.2–1)
BILIRUB UR QL STRIP: NEGATIVE
BUN SERPL-MCNC: 16 MG/DL (ref 5–25)
CALCIUM SERPL-MCNC: 9.4 MG/DL (ref 8.4–10.2)
CHLORIDE SERPL-SCNC: 105 MMOL/L (ref 96–108)
CHOLEST SERPL-MCNC: 153 MG/DL
CLARITY UR: CLEAR
CO2 SERPL-SCNC: 26 MMOL/L (ref 21–32)
COLOR UR: COLORLESS
CREAT SERPL-MCNC: 1.2 MG/DL (ref 0.6–1.3)
EOSINOPHIL # BLD AUTO: 0.06 THOUSAND/ÂΜL (ref 0–0.61)
EOSINOPHIL NFR BLD AUTO: 1 % (ref 0–6)
ERYTHROCYTE [DISTWIDTH] IN BLOOD BY AUTOMATED COUNT: 12.3 % (ref 11.6–15.1)
EST. AVERAGE GLUCOSE BLD GHB EST-MCNC: 105 MG/DL
GFR SERPL CREATININE-BSD FRML MDRD: 81 ML/MIN/1.73SQ M
GLUCOSE P FAST SERPL-MCNC: 90 MG/DL (ref 65–99)
GLUCOSE UR STRIP-MCNC: NEGATIVE MG/DL
HBA1C MFR BLD: 5.3 %
HCT VFR BLD AUTO: 46.9 % (ref 36.5–49.3)
HCV AB SER QL: NORMAL
HDLC SERPL-MCNC: 39 MG/DL
HGB BLD-MCNC: 15.7 G/DL (ref 12–17)
HGB UR QL STRIP.AUTO: NEGATIVE
IMM GRANULOCYTES # BLD AUTO: 0.01 THOUSAND/UL (ref 0–0.2)
IMM GRANULOCYTES NFR BLD AUTO: 0 % (ref 0–2)
KETONES UR STRIP-MCNC: NEGATIVE MG/DL
LDLC SERPL CALC-MCNC: 100 MG/DL (ref 0–100)
LEUKOCYTE ESTERASE UR QL STRIP: NEGATIVE
LYMPHOCYTES # BLD AUTO: 1.78 THOUSANDS/ÂΜL (ref 0.6–4.47)
LYMPHOCYTES NFR BLD AUTO: 38 % (ref 14–44)
MCH RBC QN AUTO: 30.8 PG (ref 26.8–34.3)
MCHC RBC AUTO-ENTMCNC: 33.5 G/DL (ref 31.4–37.4)
MCV RBC AUTO: 92 FL (ref 82–98)
MONOCYTES # BLD AUTO: 0.4 THOUSAND/ÂΜL (ref 0.17–1.22)
MONOCYTES NFR BLD AUTO: 9 % (ref 4–12)
NEUTROPHILS # BLD AUTO: 2.43 THOUSANDS/ÂΜL (ref 1.85–7.62)
NEUTS SEG NFR BLD AUTO: 52 % (ref 43–75)
NITRITE UR QL STRIP: NEGATIVE
NONHDLC SERPL-MCNC: 114 MG/DL
NRBC BLD AUTO-RTO: 0 /100 WBCS
PH UR STRIP.AUTO: 7 [PH]
PLATELET # BLD AUTO: 209 THOUSANDS/UL (ref 149–390)
PMV BLD AUTO: 10.4 FL (ref 8.9–12.7)
POTASSIUM SERPL-SCNC: 4.2 MMOL/L (ref 3.5–5.3)
PROT SERPL-MCNC: 7.4 G/DL (ref 6.4–8.4)
PROT UR STRIP-MCNC: NEGATIVE MG/DL
RBC # BLD AUTO: 5.1 MILLION/UL (ref 3.88–5.62)
SODIUM SERPL-SCNC: 141 MMOL/L (ref 135–147)
SP GR UR STRIP.AUTO: 1.01 (ref 1–1.03)
TREPONEMA PALLIDUM IGG+IGM AB [PRESENCE] IN SERUM OR PLASMA BY IMMUNOASSAY: NORMAL
TRIGL SERPL-MCNC: 70 MG/DL
UROBILINOGEN UR STRIP-ACNC: <2 MG/DL
WBC # BLD AUTO: 4.69 THOUSAND/UL (ref 4.31–10.16)

## 2024-10-03 PROCEDURE — 86803 HEPATITIS C AB TEST: CPT

## 2024-10-03 PROCEDURE — 87536 HIV-1 QUANT&REVRSE TRNSCRPJ: CPT

## 2024-10-03 PROCEDURE — 86361 T CELL ABSOLUTE COUNT: CPT

## 2024-10-03 PROCEDURE — 83036 HEMOGLOBIN GLYCOSYLATED A1C: CPT

## 2024-10-03 PROCEDURE — 80061 LIPID PANEL: CPT

## 2024-10-03 PROCEDURE — 81003 URINALYSIS AUTO W/O SCOPE: CPT

## 2024-10-03 PROCEDURE — 87491 CHLMYD TRACH DNA AMP PROBE: CPT

## 2024-10-03 PROCEDURE — 36415 COLL VENOUS BLD VENIPUNCTURE: CPT

## 2024-10-03 PROCEDURE — 86480 TB TEST CELL IMMUN MEASURE: CPT

## 2024-10-03 PROCEDURE — 86780 TREPONEMA PALLIDUM: CPT

## 2024-10-03 PROCEDURE — 80053 COMPREHEN METABOLIC PANEL: CPT

## 2024-10-03 PROCEDURE — 85025 COMPLETE CBC W/AUTO DIFF WBC: CPT

## 2024-10-03 PROCEDURE — 87591 N.GONORRHOEAE DNA AMP PROB: CPT

## 2024-10-04 ENCOUNTER — PATIENT OUTREACH (OUTPATIENT)
Dept: SURGERY | Facility: CLINIC | Age: 29
End: 2024-10-04

## 2024-10-04 LAB
BASOPHILS # BLD AUTO: 0 X10E3/UL (ref 0–0.2)
BASOPHILS NFR BLD AUTO: 0 %
C TRACH DNA SPEC QL NAA+PROBE: NEGATIVE
CD3+CD4+ CELLS # BLD: 706 /UL (ref 359–1519)
CD3+CD4+ CELLS NFR BLD: 39.2 % (ref 30.8–58.5)
EOSINOPHIL # BLD AUTO: 0.1 X10E3/UL (ref 0–0.4)
EOSINOPHIL NFR BLD AUTO: 1 %
ERYTHROCYTE [DISTWIDTH] IN BLOOD BY AUTOMATED COUNT: 12.1 % (ref 11.6–15.4)
GAMMA INTERFERON BACKGROUND BLD IA-ACNC: 0.08 IU/ML
HCT VFR BLD AUTO: 47.1 % (ref 37.5–51)
HGB BLD-MCNC: 15.7 G/DL (ref 13–17.7)
HIV1 RNA # PLAS NAA DL=20: NOT DETECTED {COPIES}/ML
IMM GRANULOCYTES # BLD: 0 X10E3/UL (ref 0–0.1)
IMM GRANULOCYTES NFR BLD: 0 %
LYMPHOCYTES # BLD AUTO: 1.8 X10E3/UL (ref 0.7–3.1)
LYMPHOCYTES NFR BLD AUTO: 39 %
M TB IFN-G BLD-IMP: NEGATIVE
M TB IFN-G CD4+ BCKGRND COR BLD-ACNC: 0.04 IU/ML
M TB IFN-G CD4+ BCKGRND COR BLD-ACNC: 0.06 IU/ML
MCH RBC QN AUTO: 31 PG (ref 26.6–33)
MCHC RBC AUTO-ENTMCNC: 33.3 G/DL (ref 31.5–35.7)
MCV RBC AUTO: 93 FL (ref 79–97)
MITOGEN IGNF BCKGRD COR BLD-ACNC: 9.92 IU/ML
MONOCYTES # BLD AUTO: 0.4 X10E3/UL (ref 0.1–0.9)
MONOCYTES NFR BLD AUTO: 9 %
N GONORRHOEA DNA SPEC QL NAA+PROBE: NEGATIVE
NEUTROPHILS # BLD AUTO: 2.4 X10E3/UL (ref 1.4–7)
NEUTROPHILS NFR BLD AUTO: 51 %
PLATELET # BLD AUTO: 206 X10E3/UL (ref 150–450)
RBC # BLD AUTO: 5.07 X10E6/UL (ref 4.14–5.8)
WBC # BLD AUTO: 4.7 X10E3/UL (ref 3.4–10.8)

## 2024-10-07 NOTE — PROGRESS NOTES
CM sent a crucial flu vaccine reminder letter to the client's home address to ensure the client's well-being.

## 2024-10-08 ENCOUNTER — PATIENT OUTREACH (OUTPATIENT)
Dept: SURGERY | Facility: CLINIC | Age: 29
End: 2024-10-08

## 2024-10-09 ENCOUNTER — DOCUMENTATION (OUTPATIENT)
Dept: SURGERY | Facility: CLINIC | Age: 29
End: 2024-10-09

## 2024-10-09 ENCOUNTER — OFFICE VISIT (OUTPATIENT)
Dept: SURGERY | Facility: CLINIC | Age: 29
End: 2024-10-09
Payer: COMMERCIAL

## 2024-10-09 ENCOUNTER — TELEPHONE (OUTPATIENT)
Dept: SURGERY | Facility: CLINIC | Age: 29
End: 2024-10-09

## 2024-10-09 VITALS
BODY MASS INDEX: 30.03 KG/M2 | HEART RATE: 70 BPM | TEMPERATURE: 97.5 F | SYSTOLIC BLOOD PRESSURE: 146 MMHG | OXYGEN SATURATION: 99 % | WEIGHT: 234 LBS | RESPIRATION RATE: 18 BRPM | HEIGHT: 74 IN | DIASTOLIC BLOOD PRESSURE: 67 MMHG

## 2024-10-09 DIAGNOSIS — D17.0 LIPOMA OF HEAD: ICD-10-CM

## 2024-10-09 DIAGNOSIS — I10 PRIMARY HYPERTENSION: ICD-10-CM

## 2024-10-09 DIAGNOSIS — B20 CURRENTLY ASYMPTOMATIC HIV INFECTION, WITH HISTORY OF HIV-RELATED ILLNESS (HCC): Primary | ICD-10-CM

## 2024-10-09 PROCEDURE — 99214 OFFICE O/P EST MOD 30 MIN: CPT | Performed by: INTERNAL MEDICINE

## 2024-10-09 NOTE — PROGRESS NOTES
"Ambulatory Visit  Name: Jony Aggarwal Jr.      : 1995      MRN: [id number]  Encounter Provider: Chuy Churchill MD  Encounter Date: 10/9/2024   Encounter department: ASC AT [facility]    Assessment & Plan  Currently asymptomatic HIV infection, with history of HIV-related illness (HCC)  Doing well on Biktarvy with undetectable viral load and a CD4 count of 700s.Continue ART, recheck CBC with differential, CMP, HIV RNA, and CD4 in 5 months to make sure no developing toxicity or treatment failure and follow-up in 6 months.  Stressed adherence.          Lipoma of head  Has developed soft mobile elevation onto the right side of his scalp.  Suspect this is a lipoma as he seems to have a similar 1 on his right leg that he has had since quite some time.  However if progresses or changes appearances, may need imaging or biopsy.  Will refer back to the primary for follow-up evaluation         Primary hypertension  Asymptomatic but suboptimal control on lifestyle modification.  Refer back to the primary for ongoing management           History of Present Illness     Jony Aggarwal Jr. is a 29 y.o. male who presents for routine follow-up for HIV on Biktarvy    History obtained from : patient  Review of Systems        Objective     /67 (BP Location: Right arm, Patient Position: Sitting, Cuff Size: Large)   Pulse 70   Temp 97.5 °F (36.4 °C) (Tympanic)   Resp 18   Ht 6' 2\" (1.88 m)   Wt 106 kg (234 lb)   SpO2 99%   BMI 30.04 kg/m²     Physical Exam    Objective:  Vitals:  [unfilled]  @TMAX(24)@Current: Temperature: 97.5 °F (36.4 °C)    Physical Exam:   General Appearance:  Alert, interactive, nontoxic, no acute distress.   Throat: Oropharynx moist without lesions.    Lungs:   Clear to auscultation bilaterally; no wheezes, rhonchi or rales; respirations unlabored   Heart:  RRR; no murmur, rub or gallop   Abdomen:   Soft, non-tender, non-distended, positive bowel sounds.     Extremities: No " clubbing, cyanosis or edema   Skin: No new rashes or lesions. No draining wounds noted.

## 2024-10-09 NOTE — ASSESSMENT & PLAN NOTE
Doing well on Biktarvy with undetectable viral load and a CD4 count of 700s.Continue ART, recheck CBC with differential, CMP, HIV RNA, and CD4 in 5 months to make sure no developing toxicity or treatment failure and follow-up in 6 months.  Stressed adherence.

## 2024-10-09 NOTE — ASSESSMENT & PLAN NOTE
Has developed soft mobile elevation onto the right side of his scalp.  Suspect this is a lipoma as he seems to have a similar 1 on his right leg that he has had since quite some time.  However if progresses or changes appearances, may need imaging or biopsy.  Will refer back to the primary for follow-up evaluation

## 2024-10-09 NOTE — ASSESSMENT & PLAN NOTE
Asymptomatic but suboptimal control on lifestyle modification.  Refer back to the primary for ongoing management

## 2024-10-09 NOTE — TELEPHONE ENCOUNTER
LVM for patient to call back. Wanted to let him know he can come in earlier due to cancellations.

## 2024-10-10 DIAGNOSIS — B20 CURRENTLY ASYMPTOMATIC HIV INFECTION, WITH HISTORY OF HIV-RELATED ILLNESS (HCC): Primary | ICD-10-CM

## 2024-10-10 DIAGNOSIS — I10 PRIMARY HYPERTENSION: ICD-10-CM

## 2024-10-10 NOTE — PROGRESS NOTES
"[facility] Annual Nutrition Assessment    Jony seen by RD in conjunction with ID f/u    Jony is established patient (last annual was 10/04/2023)    PMHx:  HTN, GERD,nasal congestion, proteinuria       Clinical Data/Client History    CD4 count:  706  Viral load:  <20  ART:   Biktarvy      , Patient Navigator: Robyn  : N/A  Food assistance:  N/A    Living situation: House or apartment     Psychosocial factors:  Not discussed    Mobility:  self ambulates    Physical activity: goes to the gym daily       Oral health concerns: denied oral health concerns       Typical food/beverage intake:    Breakfast chicken salad with vegetables  Lunch rice, beans, chicken and salad  Dinner same as lunch   Snacks protein bar or whey shake, fruit   Beverages black coffee, water    Appetite: Unchanged from normal    OTC vitamin, mineral, herbal supplements: creatine(MD aware)    Oral/enteral nutrition supplements:  N/A    GI problems: denied n/v/d/c    Food allergies/intolerances:  NKFA    Weight history:  Wt Readings from Last 3 Encounters:   10/09/24 106 kg (234 lb)   07/02/24 105 kg (230 lb 9.6 oz)   04/05/24 108 kg (238 lb)   10/04/2023     225    Current body weight:  234  Height:  74\"  BMI:  30  IBW:  190  %IBW  123%    Weight change: +9# x 1 year. Not significant.       Nutrition-related labs:    Lab Results   Component Value Date    HGBA1C 5.3 10/03/2024     Lab Results   Component Value Date    SODIUM 141 10/03/2024    K 4.2 10/03/2024     10/03/2024    CO2 26 10/03/2024    BUN 16 10/03/2024    CREATININE 1.20 10/03/2024    CALCIUM 9.4 10/03/2024     Lab Results   Component Value Date    CHOLESTEROL 153 10/03/2024    CHOLESTEROL 162 06/28/2024    CHOLESTEROL 200 (H) 09/14/2023     Lab Results   Component Value Date    HDL 39 (L) 10/03/2024    HDL 40 06/28/2024    HDL 41 09/14/2023     Lab Results   Component Value Date    TRIG 70 10/03/2024    TRIG 110 06/28/2024    TRIG 131 09/14/2023     Lab " Results   [facility]    [facility]    NONHDL 122 06/28/2024    NONHDL 159 09/14/2023         Current medications:     Current Outpatient Medications:     bictegravir-emtricitab-tenofovir alafenamide (Biktarvy) -25 MG tablet, TAKE 1 TABLET BY MOUTH DAILY WITH BREAKFAST, Disp: 90 tablet, Rfl: 0      Physical findings/skin integrity:  Skin intact      Nutrition Diagnosis    Problem: NO dx at this time      Estimated Nutritional Needs    [facility] REE: CBW    ~2484 kcal (based on: 1.2 stress factor)  ~84 protein (based on: .08)  ~2250ml fluid (based on: 25ml/kg/day)      Current intake estimation: meeting needs       Nutrition Intervention/Recommendations    Nutrition education intervention: not indicated     Nutrition recommendations: N/A    Supplement recommendations: No supplement recommendations at this time        Collaboration/referral of nutrition care:    N/A      Goals:  -No significant weight changes  -Stable nutrition related labs     Monitoring/Evaluation:  Will monitor weight trend, appetite, OTC supplement use and need for nutrition education.     Visit Summary  Ct's labs & weights are stable. No changes in exercise or eating habits. Ct has no nutrition questions or concerns. BMI high, however ct appears to have a good amount of muscle mass from exercise.   -Samantha Pinzon RDN,LDN

## 2024-10-28 ENCOUNTER — PATIENT OUTREACH (OUTPATIENT)
Dept: SURGERY | Facility: CLINIC | Age: 29
End: 2024-10-28

## 2024-10-30 NOTE — PROGRESS NOTES
CM sent the November Wellness Letter to the client's home address to support the client's preventative care plan.

## 2024-11-05 ENCOUNTER — PATIENT OUTREACH (OUTPATIENT)
Dept: SURGERY | Facility: CLINIC | Age: 29
End: 2024-11-05

## 2024-11-13 ENCOUNTER — PATIENT OUTREACH (OUTPATIENT)
Dept: SURGERY | Facility: CLINIC | Age: 29
End: 2024-11-13

## 2024-11-15 ENCOUNTER — PATIENT OUTREACH (OUTPATIENT)
Dept: SURGERY | Facility: CLINIC | Age: 29
End: 2024-11-15

## 2024-11-15 NOTE — PROGRESS NOTES
After attempting to contact the client to provide her with a preventive care plan, a voice message was left when there was no response.

## 2024-11-15 NOTE — PROGRESS NOTES
After attempting to contact the client to provide his with a preventive care plan, a voice message was left when there was no response.

## 2024-11-26 ENCOUNTER — PATIENT OUTREACH (OUTPATIENT)
Dept: SURGERY | Facility: CLINIC | Age: 29
End: 2024-11-26

## 2024-12-02 ENCOUNTER — PATIENT OUTREACH (OUTPATIENT)
Dept: SURGERY | Facility: CLINIC | Age: 29
End: 2024-12-02

## 2024-12-02 NOTE — PROGRESS NOTES
CM attempted to contact the client over the phone. Since there was no response, a voice message was left.

## 2024-12-04 NOTE — PROGRESS NOTES
CM attempted to schedule an recert-assessment date with the client but did not receive a response. A voice and text message were left.

## 2024-12-10 ENCOUNTER — PATIENT OUTREACH (OUTPATIENT)
Dept: SURGERY | Facility: CLINIC | Age: 29
End: 2024-12-10

## 2024-12-17 ENCOUNTER — PATIENT OUTREACH (OUTPATIENT)
Dept: SURGERY | Facility: CLINIC | Age: 29
End: 2024-12-17

## 2024-12-18 NOTE — PROGRESS NOTES
CM left a text and voice message to arrange an office meeting for the client's re-cert assessment.

## 2024-12-19 ENCOUNTER — PATIENT OUTREACH (OUTPATIENT)
Dept: SURGERY | Facility: CLINIC | Age: 29
End: 2024-12-19

## 2024-12-19 NOTE — PROGRESS NOTES
CM attempted to contact the client to schedule a recert-assessment date, but due to no response, CM left a text and voice message.

## 2024-12-23 ENCOUNTER — PATIENT OUTREACH (OUTPATIENT)
Dept: SURGERY | Facility: CLINIC | Age: 29
End: 2024-12-23

## 2024-12-24 ENCOUNTER — PATIENT OUTREACH (OUTPATIENT)
Dept: SURGERY | Facility: CLINIC | Age: 29
End: 2024-12-24

## 2024-12-26 NOTE — PROGRESS NOTES
CM dispatched an accountability letter to the client's home address to enhance his well-being and provide crucial support.

## 2024-12-27 NOTE — PROGRESS NOTES
CM attempted to contact the client to schedule an office meeting, but a voice message was left due to no response.

## 2024-12-27 NOTE — PROGRESS NOTES
CM has made multiple attempts to contact the client regarding his recertification assessment. CM left another voicemail as part of the outreach to the client. assessment. CM left another voice message as part of out-reach the client.

## 2024-12-31 ENCOUNTER — PATIENT OUTREACH (OUTPATIENT)
Dept: SURGERY | Facility: CLINIC | Age: 29
End: 2024-12-31

## 2025-01-02 ENCOUNTER — PATIENT OUTREACH (OUTPATIENT)
Dept: SURGERY | Facility: CLINIC | Age: 30
End: 2025-01-02

## 2025-01-02 NOTE — PROGRESS NOTES
CM attempted to contact the client over the phone, but there was no answer; a text and voice message were left

## 2025-01-02 NOTE — PROGRESS NOTES
CM sent an Re-cert Assessment reminder letter to the client's home address to inform the client and schedule the most convenient date for both parties.

## 2025-01-03 ENCOUNTER — PATIENT OUTREACH (OUTPATIENT)
Dept: SURGERY | Facility: CLINIC | Age: 30
End: 2025-01-03

## 2025-01-03 NOTE — PROGRESS NOTES
The client contacted the CM to inquire about the date for his Recertification Assessment.     He explained that he is only available Mondays and requested that the meeting be scheduled for 2:00 p.m.     The CM agreed and has scheduled the appointment for Monday, January 13, 2025, at 2:00 p.m.

## 2025-01-13 ENCOUNTER — PATIENT OUTREACH (OUTPATIENT)
Dept: SURGERY | Facility: CLINIC | Age: 30
End: 2025-01-13

## 2025-01-13 NOTE — PROGRESS NOTES
The Client met with CM and completed the Re-cert. Assessment. The Client responded to all assessment questions and reported good knowledge of HIV. The Client can perform all ADLs. The Client has a steady income, housing, transportation, and medical coverage.     The Client needs occasional assistance with forms and applications. The Client is in a monogamous relationship and denies any substance abuse disorder. The Client reports no dental problems at the time and having his Private Dentist which his next visit is on 1/20/2025.     The Client's goals are to continue practicing risk reduction behavior and maintain health stability through medical care regimen and meds adherence.  The client needs to submit his medical insurance card to update it on his charts and submit his sliding fee scale.

## 2025-01-16 ENCOUNTER — PATIENT OUTREACH (OUTPATIENT)
Dept: SURGERY | Facility: CLINIC | Age: 30
End: 2025-01-16

## 2025-01-31 ENCOUNTER — APPOINTMENT (OUTPATIENT)
Dept: LAB | Facility: CLINIC | Age: 30
End: 2025-01-31
Payer: COMMERCIAL

## 2025-01-31 LAB
ALBUMIN SERPL BCG-MCNC: 4.7 G/DL (ref 3.5–5)
ALP SERPL-CCNC: 56 U/L (ref 34–104)
ALT SERPL W P-5'-P-CCNC: 23 U/L (ref 7–52)
ANION GAP SERPL CALCULATED.3IONS-SCNC: 8 MMOL/L (ref 4–13)
AST SERPL W P-5'-P-CCNC: 24 U/L (ref 13–39)
BASOPHILS # BLD AUTO: 0.02 THOUSANDS/ΜL (ref 0–0.1)
BASOPHILS NFR BLD AUTO: 0 % (ref 0–1)
BILIRUB SERPL-MCNC: 2.19 MG/DL (ref 0.2–1)
BUN SERPL-MCNC: 22 MG/DL (ref 5–25)
CALCIUM SERPL-MCNC: 10 MG/DL (ref 8.4–10.2)
CHLORIDE SERPL-SCNC: 102 MMOL/L (ref 96–108)
CO2 SERPL-SCNC: 27 MMOL/L (ref 21–32)
CREAT SERPL-MCNC: 1.4 MG/DL (ref 0.6–1.3)
EOSINOPHIL # BLD AUTO: 0.05 THOUSAND/ΜL (ref 0–0.61)
EOSINOPHIL NFR BLD AUTO: 1 % (ref 0–6)
ERYTHROCYTE [DISTWIDTH] IN BLOOD BY AUTOMATED COUNT: 12.3 % (ref 11.6–15.1)
GFR SERPL CREATININE-BSD FRML MDRD: 67 ML/MIN/1.73SQ M
GLUCOSE P FAST SERPL-MCNC: 98 MG/DL (ref 65–99)
HCT VFR BLD AUTO: 47.9 % (ref 36.5–49.3)
HGB BLD-MCNC: 15.9 G/DL (ref 12–17)
IMM GRANULOCYTES # BLD AUTO: 0.02 THOUSAND/UL (ref 0–0.2)
IMM GRANULOCYTES NFR BLD AUTO: 0 % (ref 0–2)
LYMPHOCYTES # BLD AUTO: 1.97 THOUSANDS/ΜL (ref 0.6–4.47)
LYMPHOCYTES NFR BLD AUTO: 38 % (ref 14–44)
MCH RBC QN AUTO: 30.3 PG (ref 26.8–34.3)
MCHC RBC AUTO-ENTMCNC: 33.2 G/DL (ref 31.4–37.4)
MCV RBC AUTO: 91 FL (ref 82–98)
MONOCYTES # BLD AUTO: 0.45 THOUSAND/ΜL (ref 0.17–1.22)
MONOCYTES NFR BLD AUTO: 9 % (ref 4–12)
NEUTROPHILS # BLD AUTO: 2.72 THOUSANDS/ΜL (ref 1.85–7.62)
NEUTS SEG NFR BLD AUTO: 52 % (ref 43–75)
NRBC BLD AUTO-RTO: 0 /100 WBCS
PLATELET # BLD AUTO: 234 THOUSANDS/UL (ref 149–390)
PMV BLD AUTO: 10.4 FL (ref 8.9–12.7)
POTASSIUM SERPL-SCNC: 4.1 MMOL/L (ref 3.5–5.3)
PROT SERPL-MCNC: 7.7 G/DL (ref 6.4–8.4)
RBC # BLD AUTO: 5.25 MILLION/UL (ref 3.88–5.62)
SODIUM SERPL-SCNC: 137 MMOL/L (ref 135–147)
WBC # BLD AUTO: 5.23 THOUSAND/UL (ref 4.31–10.16)

## 2025-02-01 LAB
BASOPHILS # BLD AUTO: 0 X10E3/UL (ref 0–0.2)
BASOPHILS NFR BLD AUTO: 0 %
CD3+CD4+ CELLS # BLD: 688 /UL (ref 359–1519)
CD3+CD4+ CELLS NFR BLD: 43 % (ref 30.8–58.5)
EOSINOPHIL # BLD AUTO: 0.1 X10E3/UL (ref 0–0.4)
EOSINOPHIL NFR BLD AUTO: 1 %
ERYTHROCYTE [DISTWIDTH] IN BLOOD BY AUTOMATED COUNT: 12.5 % (ref 11.6–15.4)
HCT VFR BLD AUTO: 41.6 % (ref 37.5–51)
HGB BLD-MCNC: 13.8 G/DL (ref 13–17.7)
IMM GRANULOCYTES # BLD: 0 X10E3/UL (ref 0–0.1)
IMM GRANULOCYTES NFR BLD: 0 %
LYMPHOCYTES # BLD AUTO: 1.6 X10E3/UL (ref 0.7–3.1)
LYMPHOCYTES NFR BLD AUTO: 35 %
MCH RBC QN AUTO: 30.3 PG (ref 26.6–33)
MCHC RBC AUTO-ENTMCNC: 33.2 G/DL (ref 31.5–35.7)
MCV RBC AUTO: 91 FL (ref 79–97)
MONOCYTES # BLD AUTO: 0.4 X10E3/UL (ref 0.1–0.9)
MONOCYTES NFR BLD AUTO: 8 %
NEUTROPHILS # BLD AUTO: 2.6 X10E3/UL (ref 1.4–7)
NEUTROPHILS NFR BLD AUTO: 56 %
PLATELET # BLD AUTO: 259 X10E3/UL (ref 150–450)
RBC # BLD AUTO: 4.55 X10E6/UL (ref 4.14–5.8)
WBC # BLD AUTO: 4.6 X10E3/UL (ref 3.4–10.8)

## 2025-02-03 LAB — HIV1 RNA # PLAS NAA DL=20: NOT DETECTED {COPIES}/ML

## 2025-02-04 ENCOUNTER — OFFICE VISIT (OUTPATIENT)
Dept: SURGERY | Facility: CLINIC | Age: 30
End: 2025-02-04
Payer: COMMERCIAL

## 2025-02-04 VITALS
RESPIRATION RATE: 18 BRPM | HEART RATE: 76 BPM | BODY MASS INDEX: 31.39 KG/M2 | DIASTOLIC BLOOD PRESSURE: 83 MMHG | WEIGHT: 244.6 LBS | HEIGHT: 74 IN | TEMPERATURE: 98.1 F | OXYGEN SATURATION: 99 % | SYSTOLIC BLOOD PRESSURE: 136 MMHG

## 2025-02-04 DIAGNOSIS — D17.0 LIPOMA OF HEAD: ICD-10-CM

## 2025-02-04 DIAGNOSIS — E78.5 DYSLIPIDEMIA: ICD-10-CM

## 2025-02-04 DIAGNOSIS — E80.4 GILBERT'S SYNDROME: ICD-10-CM

## 2025-02-04 DIAGNOSIS — R80.9 PROTEINURIA, UNSPECIFIED TYPE: ICD-10-CM

## 2025-02-04 DIAGNOSIS — I10 PRIMARY HYPERTENSION: ICD-10-CM

## 2025-02-04 DIAGNOSIS — B20 CURRENTLY ASYMPTOMATIC HIV INFECTION, WITH HISTORY OF HIV-RELATED ILLNESS (HCC): Primary | ICD-10-CM

## 2025-02-04 DIAGNOSIS — R79.89 ELEVATED SERUM CREATININE: ICD-10-CM

## 2025-02-04 PROCEDURE — 99214 OFFICE O/P EST MOD 30 MIN: CPT | Performed by: NURSE PRACTITIONER

## 2025-02-04 RX ORDER — BICTEGRAVIR SODIUM, EMTRICITABINE, AND TENOFOVIR ALAFENAMIDE FUMARATE 50; 200; 25 MG/1; MG/1; MG/1
1 TABLET ORAL
Qty: 90 TABLET | Refills: 1 | Status: SHIPPED | OUTPATIENT
Start: 2025-02-04

## 2025-02-04 NOTE — PROGRESS NOTES
Name: Jony Aggarwal Jr.      : 1995      MRN: [id number]  Encounter Provider: JOSE Metz  Encounter Date: 2025   Encounter department: [facility] AT [facility]  :  Assessment & Plan  Currently asymptomatic HIV infection, with history of HIV-related illness (HCC)  Doing well on Biktarvy with an undetectable VL.  Pt reports 100% medication compliance on HAART.  Stressed the importance of 100% medication adherence  Monitor CD4. HIV RNA, CMP, and CBCD to monitor for any developing virologic response, treatment failure, or drug toxicities  Follow up with Dr. Churchill or Dr. Hernandez   Continue Biktarvy  HIV Counseling:    Viral Load: Undetectable    CD4 Count: 688      Denies side effects.  Stressed the importance of adherence.  Continue follow up in the ID clinic with Dr. Churchill or Dr. Hernandez.    Reviewed the most recent labs, including the CD4 and viral load.  Discussed the risks and benefits of treatment options, instructions for management, importance of treatment adherence, and reduction of risk factors.  Educated on possible medication side effects.    Counseled on routes of HIV transmission, including the risk of  infection.  Emphasized that viral suppression is the best method to prevent HIV transmission.  At this time the pt denies the need for HIV testing of anyone in their life.    Total encounter time was greater than 35 minutes.  Greater than 20 minutes were spent on counseling and patient education.  Pt voices understanding and agreement with treatment plan.        Orders:    bictegravir-emtricitab-tenofovir alafenamide (Biktarvy) -25 MG tablet; Take 1 tablet by mouth daily with breakfast    Dyslipidemia  LDL: 100 at goal.  However HDL is suboptimally controlled: 39.  Continue to focus on lifestyle modifications  Recommend exercising regularly  Continue to follow with [facility] dietitian         Proteinuria, unspecified type  UA's have been bland.  Last several UAs:  Without protein.  Continue to monitor UA annually for proteinuria         Gilbert's syndrome  T. bili remains elevated and most likely secondary to Gilbert's syndrome.  Continue to monitor CMP  No further workup needed at this time         Primary hypertension  BP: Acceptable.  BP readings prior to that have been ranging 110s to 130s over 60s to 80s off hypertensive agents.  He does not check BP at home.  Continue to focus on lifestyle modifications  Continue to follow Mediterranean diet with low sodium 2 g intake daily  Continue monitor BP at home         Lipoma of head  Reports noticing on right side of scalp soft lump has gotten bigger since October.  Was first noted by Dr. Churchill.  Refer to Derm for evaluation    Orders:    Ambulatory Referral to Dermatology; Future    Elevated serum creatinine  New finding on CMP.  Creatinine: 1.40 above baseline (0.89-1.20).  Unclear etiology as denies any recent diarrhea, vomiting or other illnesses.  Denies any recent NSAID use.  Recheck BMP in the next week or so  Discussed the importance of avoiding all NSAID products such as Aleve, Advil, Motrin, ibuprofen, Mobic, as they can cause acute AINSLEY  Discussed the importance of drinking 64 ounces of water at least daily  If continues to remain elevated will require referral to nephrology for further evaluation and workup    Orders:    Basic metabolic panel; Future        History of Present Illness   HPI  Jony Aggarwal . is a 29 y.o. male who presents for 6-month follow-up for management of HIV and chronic healthcare conditions.  Jony reports he has been doing good. He has not been checking BP.  He has gained 14 pounds most likely due to not working out as much and eating unhealthy and weekends.    He does not endorse any fever, chills, nausea, vomiting, cough, SOB, diarrhea, or night sweats.    History obtained from: patient    Review of Systems   Constitutional: Negative.    HENT: Negative.          Lump on right side of  "scalpel   Respiratory: Negative.     Cardiovascular: Negative.    Gastrointestinal: Negative.    Neurological: Negative.    Psychiatric/Behavioral: Negative.       Current Outpatient Medications on File Prior to Visit   Medication Sig Dispense Refill    [DISCONTINUED] bictegravir-emtricitab-tenofovir alafenamide (Biktarvy) -25 MG tablet TAKE 1 TABLET BY MOUTH DAILY WITH BREAKFAST 90 tablet 0     No current facility-administered medications on file prior to visit.         Objective   /83 (BP Location: Right arm, Patient Position: Sitting, Cuff Size: Standard)   Pulse 76   Temp 98.1 °F (36.7 °C) (Tympanic)   Resp 18   Ht 6' 2\" (1.88 m)   Wt 111 kg (244 lb 9.6 oz)   SpO2 99%   BMI 31.40 kg/m²      Physical Exam  Vitals and nursing note reviewed.   Constitutional:       General: He is not in acute distress.     Appearance: Normal appearance. He is not ill-appearing.   HENT:      Head:     Cardiovascular:      Rate and Rhythm: Normal rate and regular rhythm.      Chest Wall: PMI is not displaced.      Pulses: Normal pulses.      Heart sounds: Normal heart sounds.   Pulmonary:      Effort: Pulmonary effort is normal.      Breath sounds: Normal breath sounds.   Abdominal:      General: Bowel sounds are normal.   Musculoskeletal:         General: Normal range of motion.   Lymphadenopathy:      Cervical: No cervical adenopathy.   Skin:     General: Skin is warm and dry.      Capillary Refill: Capillary refill takes less than 2 seconds.   Neurological:      Mental Status: He is alert and oriented to person, place, and time.   Psychiatric:         Mood and Affect: Mood normal.         Behavior: Behavior normal.         Thought Content: Thought content normal.         Judgment: Judgment normal.           BMI Counseling: Body mass index is 31.4 kg/m². The BMI is above normal. Nutrition recommendations include reducing portion sizes, decreasing overall calorie intake, 3-5 servings of fruits/vegetables daily, " consuming healthier snacks, moderation in carbohydrate intake, increasing intake of lean protein, reducing intake of saturated fat and trans fat, and reducing intake of cholesterol. Exercise recommendations include moderate aerobic physical activity for 150 minutes/week and strength training exercises.

## 2025-02-04 NOTE — ASSESSMENT & PLAN NOTE
Doing well on Biktarvy with an undetectable VL.  Pt reports 100% medication compliance on HAART.  Stressed the importance of 100% medication adherence  Monitor CD4. HIV RNA, CMP, and CBCD to monitor for any developing virologic response, treatment failure, or drug toxicities  Follow up with Dr. Churchill or Dr. Hernandez   Continue Biktarvy  HIV Counseling:    Viral Load: Undetectable    CD4 Count: 688      Denies side effects.  Stressed the importance of adherence.  Continue follow up in the ID clinic with Dr. Churchill or Dr. Hernandez.    Reviewed the most recent labs, including the CD4 and viral load.  Discussed the risks and benefits of treatment options, instructions for management, importance of treatment adherence, and reduction of risk factors.  Educated on possible medication side effects.    Counseled on routes of HIV transmission, including the risk of  infection.  Emphasized that viral suppression is the best method to prevent HIV transmission.  At this time the pt denies the need for HIV testing of anyone in their life.    Total encounter time was greater than 35 minutes.  Greater than 20 minutes were spent on counseling and patient education.  Pt voices understanding and agreement with treatment plan.        Orders:    bictegravir-emtricitab-tenofovir alafenamide (Biktarvy) -25 MG tablet; Take 1 tablet by mouth daily with breakfast

## 2025-02-04 NOTE — ASSESSMENT & PLAN NOTE
UA's have been bland.  Last several UAs: Without protein.  Continue to monitor UA annually for proteinuria

## 2025-02-04 NOTE — ASSESSMENT & PLAN NOTE
Reports noticing on right side of scalp soft lump has gotten bigger since October.  Was first noted by Dr. Churchill.  Refer to Derm for evaluation    Orders:    Ambulatory Referral to Dermatology; Future

## 2025-02-04 NOTE — ASSESSMENT & PLAN NOTE
New finding on CMP.  Creatinine: 1.40 above baseline (0.89-1.20).  Unclear etiology as denies any recent diarrhea, vomiting or other illnesses.  Denies any recent NSAID use.  Recheck BMP in the next week or so  Discussed the importance of avoiding all NSAID products such as Aleve, Advil, Motrin, ibuprofen, Mobic, as they can cause acute AINSLEY  Discussed the importance of drinking 64 ounces of water at least daily  If continues to remain elevated will require referral to nephrology for further evaluation and workup    Orders:    Basic metabolic panel; Future

## 2025-02-04 NOTE — ASSESSMENT & PLAN NOTE
BP: Acceptable.  BP readings prior to that have been ranging 110s to 130s over 60s to 80s off hypertensive agents.  He does not check BP at home.  Continue to focus on lifestyle modifications  Continue to follow Mediterranean diet with low sodium 2 g intake daily  Continue monitor BP at home

## 2025-02-04 NOTE — ASSESSMENT & PLAN NOTE
T. bili remains elevated and most likely secondary to Gilbert's syndrome.  Continue to monitor CMP  No further workup needed at this time

## 2025-02-04 NOTE — ASSESSMENT & PLAN NOTE
LDL: 100 at goal.  However HDL is suboptimally controlled: 39.  Continue to focus on lifestyle modifications  Recommend exercising regularly  Continue to follow with [facility] dietitian

## 2025-02-06 ENCOUNTER — PATIENT OUTREACH (OUTPATIENT)
Dept: SURGERY | Facility: CLINIC | Age: 30
End: 2025-02-06

## 2025-02-24 NOTE — PROGRESS NOTES
CM attempted to contact the client over the phone, but there was no answer; a text and voice message were left.

## 2025-03-12 ENCOUNTER — PATIENT OUTREACH (OUTPATIENT)
Dept: SURGERY | Facility: CLINIC | Age: 30
End: 2025-03-12

## 2025-03-19 NOTE — PROGRESS NOTES
The Client confirmed that her medications had been delivered. CM encouraged the client to follow the medication adherence regimen as the MD had directed, and the client agreed.

## 2025-04-01 ENCOUNTER — TELEPHONE (OUTPATIENT)
Dept: SURGERY | Facility: CLINIC | Age: 30
End: 2025-04-01

## 2025-04-01 ENCOUNTER — PATIENT OUTREACH (OUTPATIENT)
Dept: SURGERY | Facility: CLINIC | Age: 30
End: 2025-04-01

## 2025-04-01 DIAGNOSIS — I10 PRIMARY HYPERTENSION: ICD-10-CM

## 2025-04-01 DIAGNOSIS — R79.89 ELEVATED SERUM CREATININE: ICD-10-CM

## 2025-04-01 DIAGNOSIS — B20 HUMAN IMMUNODEFICIENCY VIRUS (HIV) DISEASE (HCC): Primary | ICD-10-CM

## 2025-04-03 ENCOUNTER — PATIENT OUTREACH (OUTPATIENT)
Dept: SURGERY | Facility: CLINIC | Age: 30
End: 2025-04-03

## 2025-04-04 NOTE — PROGRESS NOTES
CM contacted the client to follow up on the client's adherence to medication. The client reported that he has continued to comply with his medication regimen as usual.

## 2025-04-09 NOTE — PROGRESS NOTES
CM contacted the client to follow up on her medication adherence, and the client confirmed that he complies with all medical care plans.

## 2025-04-10 ENCOUNTER — APPOINTMENT (OUTPATIENT)
Dept: LAB | Facility: CLINIC | Age: 30
End: 2025-04-10
Payer: COMMERCIAL

## 2025-04-10 DIAGNOSIS — R79.89 ELEVATED SERUM CREATININE: ICD-10-CM

## 2025-04-10 LAB
ALBUMIN SERPL BCG-MCNC: 4.7 G/DL (ref 3.5–5)
ALP SERPL-CCNC: 58 U/L (ref 34–104)
ALT SERPL W P-5'-P-CCNC: 19 U/L (ref 7–52)
ANION GAP SERPL CALCULATED.3IONS-SCNC: 9 MMOL/L (ref 4–13)
AST SERPL W P-5'-P-CCNC: 23 U/L (ref 13–39)
BASOPHILS # BLD AUTO: 0.02 THOUSANDS/ÂΜL (ref 0–0.1)
BASOPHILS NFR BLD AUTO: 0 % (ref 0–1)
BILIRUB SERPL-MCNC: 1.83 MG/DL (ref 0.2–1)
BUN SERPL-MCNC: 18 MG/DL (ref 5–25)
CALCIUM SERPL-MCNC: 9.6 MG/DL (ref 8.4–10.2)
CHLORIDE SERPL-SCNC: 102 MMOL/L (ref 96–108)
CO2 SERPL-SCNC: 28 MMOL/L (ref 21–32)
CREAT SERPL-MCNC: 1.36 MG/DL (ref 0.6–1.3)
EOSINOPHIL # BLD AUTO: 0.05 THOUSAND/ÂΜL (ref 0–0.61)
EOSINOPHIL NFR BLD AUTO: 1 % (ref 0–6)
ERYTHROCYTE [DISTWIDTH] IN BLOOD BY AUTOMATED COUNT: 12.6 % (ref 11.6–15.1)
GFR SERPL CREATININE-BSD FRML MDRD: 69 ML/MIN/1.73SQ M
GLUCOSE P FAST SERPL-MCNC: 81 MG/DL (ref 65–99)
HCT VFR BLD AUTO: 47.7 % (ref 36.5–49.3)
HGB BLD-MCNC: 15.6 G/DL (ref 12–17)
IMM GRANULOCYTES # BLD AUTO: 0.02 THOUSAND/UL (ref 0–0.2)
IMM GRANULOCYTES NFR BLD AUTO: 0 % (ref 0–2)
LYMPHOCYTES # BLD AUTO: 1.8 THOUSANDS/ÂΜL (ref 0.6–4.47)
LYMPHOCYTES NFR BLD AUTO: 33 % (ref 14–44)
MCH RBC QN AUTO: 30.3 PG (ref 26.8–34.3)
MCHC RBC AUTO-ENTMCNC: 32.7 G/DL (ref 31.4–37.4)
MCV RBC AUTO: 93 FL (ref 82–98)
MONOCYTES # BLD AUTO: 0.48 THOUSAND/ÂΜL (ref 0.17–1.22)
MONOCYTES NFR BLD AUTO: 9 % (ref 4–12)
NEUTROPHILS # BLD AUTO: 3.03 THOUSANDS/ÂΜL (ref 1.85–7.62)
NEUTS SEG NFR BLD AUTO: 57 % (ref 43–75)
NRBC BLD AUTO-RTO: 0 /100 WBCS
PLATELET # BLD AUTO: 224 THOUSANDS/UL (ref 149–390)
PMV BLD AUTO: 10.5 FL (ref 8.9–12.7)
POTASSIUM SERPL-SCNC: 3.5 MMOL/L (ref 3.5–5.3)
PROT SERPL-MCNC: 7.2 G/DL (ref 6.4–8.4)
RBC # BLD AUTO: 5.15 MILLION/UL (ref 3.88–5.62)
SODIUM SERPL-SCNC: 139 MMOL/L (ref 135–147)
WBC # BLD AUTO: 5.4 THOUSAND/UL (ref 4.31–10.16)

## 2025-04-10 PROCEDURE — 87536 HIV-1 QUANT&REVRSE TRNSCRPJ: CPT

## 2025-04-10 PROCEDURE — 36415 COLL VENOUS BLD VENIPUNCTURE: CPT

## 2025-04-10 PROCEDURE — 85025 COMPLETE CBC W/AUTO DIFF WBC: CPT

## 2025-04-10 PROCEDURE — 86361 T CELL ABSOLUTE COUNT: CPT

## 2025-04-10 PROCEDURE — 80053 COMPREHEN METABOLIC PANEL: CPT

## 2025-04-11 LAB
BASOPHILS # BLD AUTO: 0 X10E3/UL (ref 0–0.2)
BASOPHILS NFR BLD AUTO: 0 %
CD3+CD4+ CELLS # BLD: 828 /UL (ref 359–1519)
CD3+CD4+ CELLS NFR BLD: 46 % (ref 30.8–58.5)
EOSINOPHIL # BLD AUTO: 0.1 X10E3/UL (ref 0–0.4)
EOSINOPHIL NFR BLD AUTO: 1 %
ERYTHROCYTE [DISTWIDTH] IN BLOOD BY AUTOMATED COUNT: 12.4 % (ref 11.6–15.4)
HCT VFR BLD AUTO: 45.2 % (ref 37.5–51)
HGB BLD-MCNC: 15.5 G/DL (ref 13–17.7)
HIV1 RNA # PLAS NAA DL=20: NOT DETECTED {COPIES}/ML
IMM GRANULOCYTES # BLD: 0 X10E3/UL (ref 0–0.1)
IMM GRANULOCYTES NFR BLD: 0 %
LYMPHOCYTES # BLD AUTO: 1.8 X10E3/UL (ref 0.7–3.1)
LYMPHOCYTES NFR BLD AUTO: 34 %
MCH RBC QN AUTO: 31.5 PG (ref 26.6–33)
MCHC RBC AUTO-ENTMCNC: 34.3 G/DL (ref 31.5–35.7)
MCV RBC AUTO: 92 FL (ref 79–97)
MONOCYTES # BLD AUTO: 0.5 X10E3/UL (ref 0.1–0.9)
MONOCYTES NFR BLD AUTO: 9 %
NEUTROPHILS # BLD AUTO: 3 X10E3/UL (ref 1.4–7)
NEUTROPHILS NFR BLD AUTO: 56 %
PLATELET # BLD AUTO: 213 X10E3/UL (ref 150–450)
RBC # BLD AUTO: 4.92 X10E6/UL (ref 4.14–5.8)
WBC # BLD AUTO: 5.3 X10E3/UL (ref 3.4–10.8)

## 2025-04-14 ENCOUNTER — PATIENT OUTREACH (OUTPATIENT)
Dept: SURGERY | Facility: CLINIC | Age: 30
End: 2025-04-14

## 2025-04-15 DIAGNOSIS — N18.2 STAGE 2 CHRONIC KIDNEY DISEASE: Primary | ICD-10-CM

## 2025-04-15 DIAGNOSIS — R80.9 PROTEINURIA, UNSPECIFIED TYPE: ICD-10-CM

## 2025-04-15 DIAGNOSIS — I10 PRIMARY HYPERTENSION: ICD-10-CM

## 2025-04-16 ENCOUNTER — DOCUMENTATION (OUTPATIENT)
Dept: SURGERY | Facility: CLINIC | Age: 30
End: 2025-04-16

## 2025-04-16 ENCOUNTER — OFFICE VISIT (OUTPATIENT)
Dept: SURGERY | Facility: CLINIC | Age: 30
End: 2025-04-16
Payer: COMMERCIAL

## 2025-04-16 ENCOUNTER — TREATMENT (OUTPATIENT)
Dept: SURGERY | Facility: CLINIC | Age: 30
End: 2025-04-16

## 2025-04-16 VITALS
WEIGHT: 230 LBS | TEMPERATURE: 97.4 F | RESPIRATION RATE: 18 BRPM | DIASTOLIC BLOOD PRESSURE: 66 MMHG | HEIGHT: 74 IN | HEART RATE: 78 BPM | OXYGEN SATURATION: 100 % | BODY MASS INDEX: 29.52 KG/M2 | SYSTOLIC BLOOD PRESSURE: 149 MMHG

## 2025-04-16 DIAGNOSIS — N18.2 STAGE 2 CHRONIC KIDNEY DISEASE: ICD-10-CM

## 2025-04-16 DIAGNOSIS — Z72.89 OTHER PROBLEMS RELATED TO LIFESTYLE: ICD-10-CM

## 2025-04-16 DIAGNOSIS — E78.5 DYSLIPIDEMIA: ICD-10-CM

## 2025-04-16 DIAGNOSIS — I10 PRIMARY HYPERTENSION: ICD-10-CM

## 2025-04-16 DIAGNOSIS — B20 CURRENTLY ASYMPTOMATIC HIV INFECTION, WITH HISTORY OF HIV-RELATED ILLNESS (HCC): Primary | ICD-10-CM

## 2025-04-16 DIAGNOSIS — B20 HUMAN IMMUNODEFICIENCY VIRUS (HIV) DISEASE (HCC): Primary | ICD-10-CM

## 2025-04-16 DIAGNOSIS — R79.89 ELEVATED SERUM CREATININE: ICD-10-CM

## 2025-04-16 DIAGNOSIS — Z11.3 ENCOUNTER FOR SCREENING FOR BACTERIAL SEXUALLY TRANSMITTED DISEASE: ICD-10-CM

## 2025-04-16 DIAGNOSIS — F32.A DEPRESSION, UNSPECIFIED DEPRESSION TYPE: Primary | ICD-10-CM

## 2025-04-16 DIAGNOSIS — D72.89 OTHER SPECIFIED DISORDERS OF WHITE BLOOD CELLS: ICD-10-CM

## 2025-04-16 DIAGNOSIS — Z20.2 CONTACT WITH AND (SUSPECTED) EXPOSURE TO INFECTIONS WITH A PREDOMINANTLY SEXUAL MODE OF TRANSMISSION: ICD-10-CM

## 2025-04-16 DIAGNOSIS — Z11.1 SCREENING-PULMONARY TB: ICD-10-CM

## 2025-04-16 PROCEDURE — 99214 OFFICE O/P EST MOD 30 MIN: CPT | Performed by: INTERNAL MEDICINE

## 2025-04-16 PROCEDURE — PBNCHG PB NO CHARGE PLACEHOLDER

## 2025-04-16 NOTE — ASSESSMENT & PLAN NOTE
Suboptimal control.  Patient favors lifestyle modifications and no new medications.  Will continue to monitor.  Discussed with the primary.

## 2025-04-16 NOTE — PROGRESS NOTES
"Name: Jony Aggarwal Jr.      : 1995      MRN: [id number]  Encounter Provider: Chuy Churchill MD  Encounter Date: 2025   Encounter department: [facility] AT [facility]  :  Assessment & Plan  Currently asymptomatic HIV infection, with history of HIV-related illness (HCC)  Doing well on Biktarvy with an undetectable viral load and a CD4 count in the 800s.Continue ART, recheck CBC with differential, CMP, HIV RNA, and CD4 in 5 months to make sure no developing toxicity or treatment failure and follow-up in 6 months.  Stressed adherence.        Stage 2 chronic kidney disease  Bump in the creatinine noted of unclear etiology.  Will refer to nephrology for evaluation.  Urinalysis bland but with history of trace proteinuria.  No need to change the ART based on the current renal function.  Will monitor closely.    Lab Results   Component Value Date    EGFR 69 04/10/2025    EGFR 67 2025    EGFR 81 10/03/2024    CREATININE 1.36 (H) 04/10/2025    CREATININE 1.40 (H) 2025    CREATININE 1.20 10/03/2024            Primary hypertension  Suboptimal control.  Patient favors lifestyle modifications and no new medications.  Will continue to monitor.  Discussed with the primary.             Patient was provided medication, adherence and prevention education.    History of Present Illness   Routine follow-up for HIV.  Patient claims 100% adherence with Biktarvy. Patient denies any notable side effects.  Overall the feeling well.  The patient denies any fever chills or sweats, denies any nausea vomiting or diarrhea, denies any cough or shortness of breath.    ROS: A complete review of systems are negative other than that noted in the HPI        Objective   /66 (BP Location: Right arm, Patient Position: Sitting, Cuff Size: Large)   Pulse 78   Temp (!) 97.4 °F (36.3 °C) (Tympanic)   Resp 18   Ht 6' 2\" (1.88 m)   Wt 104 kg (230 lb)   SpO2 100%   BMI 29.53 kg/m²     General: Alert, interactive, " appearing well, nontoxic, no acute distress.  Neck: Supple without lymphadenopathy, no thyromegaly or masses  Throat: Oropharynx moist without lesions.   Lungs: Clear to auscultation bilaterally; no wheezes, rhonchi or rales; respirations unlabored  Heart: RRR; no murmur, rub or gallop  Abdomen: Soft, non-tender, non-distended, positive bowel sounds.    Extremities: No clubbing, cyanosis or edema  Skin: No new rashes or lesions. No draining wounds noted.    Lab Results: I have personally reviewed pertinent labs.  Lab Results   Component Value Date    K 3.5 04/10/2025     04/10/2025    CO2 28 04/10/2025    BUN 18 04/10/2025    CREATININE 1.36 (H) 04/10/2025    GLUF 81 04/10/2025    CALCIUM 9.6 04/10/2025    AST 23 04/10/2025    ALT 19 04/10/2025    ALKPHOS 58 04/10/2025    EGFR 69 04/10/2025     Lab Results   Component Value Date    WBC 5.40 04/10/2025    WBC 5.3 04/10/2025    HGB 15.6 04/10/2025    HGB 15.5 04/10/2025    HCT 47.7 04/10/2025    HCT 45.2 04/10/2025    MCV 93 04/10/2025    MCV 92 04/10/2025     04/10/2025     04/10/2025     Lab Results   Component Value Date    HEPCAB Non-reactive 10/03/2024     Lab Results   Component Value Date    HAV Reactive (A) 11/16/2022    HEPCAB Non-reactive 10/03/2024     Lab Results   Component Value Date    RPR Non-Reactive 11/16/2022     CD4 ABS   Date/Time Value Ref Range Status   04/10/2025 08:30  359 - 1519 /uL Final     HIV-1 RNA by PCR, Qn   Date/Time Value Ref Range Status   06/30/2023 10:05 AM <20 copies/mL Final     Comment:     HIV-1 RNA not detected  The reportable range for this assay is 20 to 10,000,000  copies HIV-1 RNA/mL.     HIV-1 TARGET   Date/Time Value Ref Range Status   04/10/2025 08:30 AM Not Detected Not Detected Final

## 2025-04-16 NOTE — PROGRESS NOTES
Pastoral Care Progress Note    2025  Patient: Jony Aggarwal Jr. : 1995  Encounter Date & Time: 2025   MRN: [id number]        The  met with Mr. Aggarwal for continued care and support. Mr. Aggarwal advised he was doing well. He is trying to stay active by self-teaching himself piano and Romanian. He shared that he disconnected from his social media accounts to free up his mind. He expressed feeling much better. Mr. Aggarwal talked about his country in [place] and exploring other travel.      The  explored spiritual wellbeing and coping. Mr. Aggarwal describe positive practices like visiting his family in [place] and being engaged in learning activities.  Mr. Aggarwal appeared to light hearted and to be well.  The  encouraged  him to continue caring for himself and to reach out to the  as needed.

## 2025-04-16 NOTE — ASSESSMENT & PLAN NOTE
Bump in the creatinine noted of unclear etiology.  Will refer to nephrology for evaluation.  Urinalysis bland but with history of trace proteinuria.  No need to change the ART based on the current renal function.  Will monitor closely.    Lab Results   Component Value Date    EGFR 69 04/10/2025    EGFR 67 01/31/2025    EGFR 81 10/03/2024    CREATININE 1.36 (H) 04/10/2025    CREATININE 1.40 (H) 01/31/2025    CREATININE 1.20 10/03/2024

## 2025-04-16 NOTE — PROGRESS NOTES
Assessment/Plan: Pt was approached by S within ID clinic to review his MH's status by completing the depression screening. During today's contact, pt disclosed being stable with no pressing issues to address. Pt described been managing daily stressors assertively while relying on extended support from SO, family, and friends. Pt described been sleeping and eating well, along with focusing on self-care habits' reinforcements.     Pt's emotions and cognitions were validated throughout contact, along with receiving positive reinforcements for his awareness, and self-care applications. A brief psycho-educational conversation was developed regarding the impact of suppressed emotions and cognitions displayed as psychosomatic projections. The depression inventory was completed by pt, scoring (PHQ-9=0) as a display of minimal depressive traits  without SI or HI. At the end of contact, pt was encouraged to benefit from  services which pt agreed upon as needed.     [facility]     Today patient present with No chief complaint on file.    Patient would likely benefit from: Addressing the impact of unmanaged stressors.   Consider/focus/continue: Monitoring pt's emotional, cognitive, and behavioral health's stability.   Stage of change: Pre-contemplation  Plan/ Behavioral Recommendations: Ongoing  interventions per pt's coordinated care, and/or pt's request of services.       There are no diagnoses linked to this encounter.      Subjective:     Patient ID: oJny Aggarwal Jr. is a 29 y.o. male.    HPI    History of Present Illness:      Patient is being seen for annual behavioral health assessment.   Patient denies current behavioral health concerns.    [unfilled]       Review of Systems      Objective:     Physical Exam      [facility]    Orientation     Person: yes    Place: yes    Time: yes    Appearance    Well Developed: yes healthy    Uncomfortable: no    Normal Body Odor: yes    Smells of Feces:  no    Smells of Urine: no    Disheveled: no    Well Nourished: yes overweight    Grooming Unkempt: no    Poor Eye Contact: no    Hirsute: yes    Looks Tired: no    Acutely Exhausted: noappearance reflects stated age    Mood and Affect:     Appropriate: yes    Euthymic: yes    Irritable: no    Angry: no    Anxious: no    Depressed:no    Blunted:no    Labile: no    Restricted: no    Harm to Self or Others: None reported by pt.     Substance Abuse: None reported by pt.

## 2025-04-16 NOTE — ASSESSMENT & PLAN NOTE
Doing well on Biktarvy with an undetectable viral load and a CD4 count in the 800s.Continue ART, recheck CBC with differential, CMP, HIV RNA, and CD4 in 5 months to make sure no developing toxicity or treatment failure and follow-up in 6 months.  Stressed adherence.

## 2025-04-18 DIAGNOSIS — R80.9 PROTEINURIA, UNSPECIFIED TYPE: Primary | ICD-10-CM

## 2025-04-18 DIAGNOSIS — N18.2 STAGE 2 CHRONIC KIDNEY DISEASE: ICD-10-CM

## 2025-05-02 ENCOUNTER — PATIENT OUTREACH (OUTPATIENT)
Dept: SURGERY | Facility: CLINIC | Age: 30
End: 2025-05-02

## 2025-05-06 ENCOUNTER — PATIENT OUTREACH (OUTPATIENT)
Dept: SURGERY | Facility: CLINIC | Age: 30
End: 2025-05-06

## 2025-05-06 NOTE — PROGRESS NOTES
CM attempted to contact the client over the phone to follow up with his preventive care plan. Since there was no response, a voice message was left.

## 2025-05-13 ENCOUNTER — PATIENT OUTREACH (OUTPATIENT)
Dept: SURGERY | Facility: CLINIC | Age: 30
End: 2025-05-13

## 2025-06-10 ENCOUNTER — PATIENT OUTREACH (OUTPATIENT)
Dept: SURGERY | Facility: CLINIC | Age: 30
End: 2025-06-10

## 2025-06-10 NOTE — PROGRESS NOTES
The client contacted CM via text message, requesting assistance with the invoice he was receiving. CM asked for the invoice, but the client did not respond to CM.

## 2025-06-16 ENCOUNTER — CONSULT (OUTPATIENT)
Dept: NEPHROLOGY | Facility: CLINIC | Age: 30
End: 2025-06-16
Attending: NURSE PRACTITIONER
Payer: COMMERCIAL

## 2025-06-16 ENCOUNTER — PATIENT OUTREACH (OUTPATIENT)
Dept: SURGERY | Facility: CLINIC | Age: 30
End: 2025-06-16

## 2025-06-16 VITALS
DIASTOLIC BLOOD PRESSURE: 82 MMHG | HEART RATE: 70 BPM | HEIGHT: 74 IN | SYSTOLIC BLOOD PRESSURE: 130 MMHG | BODY MASS INDEX: 30.16 KG/M2 | WEIGHT: 235 LBS

## 2025-06-16 DIAGNOSIS — N28.9 RENAL INSUFFICIENCY: Primary | ICD-10-CM

## 2025-06-16 PROBLEM — R80.9 PROTEINURIA: Status: RESOLVED | Noted: 2022-11-29 | Resolved: 2025-06-16

## 2025-06-16 PROBLEM — N18.2 STAGE 2 CHRONIC KIDNEY DISEASE: Status: RESOLVED | Noted: 2025-04-15 | Resolved: 2025-06-16

## 2025-06-16 PROCEDURE — 99204 OFFICE O/P NEW MOD 45 MIN: CPT | Performed by: INTERNAL MEDICINE

## 2025-06-16 NOTE — LETTER
2025     JOSE Metz  [address] [facility] [address]    Patient: Jony Aggarwal Jr.   YOB: 1995   Date of Visit: 2025       Dear JOSE Wang:    Thank you for referring Jony Aggarwal to me for evaluation. Below are my notes for this consultation.    If you have questions, please do not hesitate to call me. I look forward to following your patient along with you.         Sincerely,        Michael Baker MD        CC: No Recipients    Michael Baker MD  2025  2:10 PM  Sign when Signing Visit  Name: Jony Aggarwal Jr.      : 1995      MRN: [id number]  Encounter Provider: Michael Baker MD  Encounter Date: 2025   Encounter department: [facility]  :  Assessment & Plan  Renal insufficiency    The patient had an increase in his creatinine in January of this year to 1.4 and then repeated recently at 1.3.  Prior to that it ran around 1.  Looking at multiple urinalyses there was no protein or blood that would not suggest an aggressive intrinsic process so that is good.  The only thing in his history that I think could contribute he is he actually says he started taking creatine supplements to exercise maybe about a year ago.  Creatine when it is metabolized generates creatinine so even though it is not present in every patient it is possible to raise creatinine levels.  If it is the case it is more on an increased production of creatinine etiology as opposed to worsening of kidney function.    I am going to have him stop his creatine supplement for 2 weeks then repeat lab work.  I will do a BMP to see if creatinine is lower and urine protein creatinine ratio to make sure there is no significant proteinuria measured.    I will then contact him with the results and determine if any follow-up or further testing is required and this was explained in detail to the patient he is aware.  No other  changes.    Orders:  •  Basic metabolic panel  •  Protein / creatinine ratio, urine    I have spent a total time of 60 minutes in caring for this patient on the day of the visit/encounter including Diagnostic results, Instructions for management, Patient and family education, Reviewing/placing orders in the medical record (including tests, medications, and/or procedures), and Obtaining or reviewing history  .     History of Present Illness  HPI  Jony Aggarwal Jr. is a 29 y.o. male who presents for his first visit.  The patient is in good health he is currently on treatment for HIV and it is not active and has never had an opportunistic infection.  He is being monitored in infectious disease clinic and it was noticed that he had an elevated creatinine in January and then repeat value was elevated so was referred for evaluation and recommendations.  The patient had no complaints feels well.  History obtained from: patient    Review of Systems   Constitutional:  Negative for appetite change, chills and fever.   HENT: Negative.     Eyes: Negative.    Respiratory:  Negative for cough, chest tightness, shortness of breath and wheezing.    Cardiovascular:  Negative for chest pain and leg swelling.   Gastrointestinal:  Negative for abdominal pain, diarrhea, nausea and vomiting.   Genitourinary:  Negative for dysuria, flank pain and hematuria.   Neurological:  Negative for dizziness and headaches.   Psychiatric/Behavioral:  Negative for agitation, behavioral problems, confusion and decreased concentration.      Medical History Reviewed by provider this encounter:     .  Past Medical History  Past Medical History[1] No history of heart disease, kidney stone, kidney infection, liver disease, lung disease, hepatitis.  Past Surgical History[2]  Family History[3] No family history of kidney disease that he is aware of.   reports that he has never smoked. He has never used smokeless tobacco. He reports current alcohol use. He  "reports that he does not use drugs.  Current Outpatient Medications   Medication Instructions   • bictegravir-emtricitab-tenofovir alafenamide (Biktarvy) -25 MG tablet 1 tablet, Oral, Daily with breakfast   Allergies[4]   Medications Ordered Prior to Encounter[5]   Social History[6] .  Denied tobacco ethanol or drug abuse.  Born in [place].     Objective  /82   Pulse 70   Ht 6' 2\" (1.88 m)   Wt 107 kg (235 lb)   BMI 30.17 kg/m²      Physical Exam  Constitutional:       General: He is not in acute distress.     Appearance: Normal appearance. He is not ill-appearing or toxic-appearing.   HENT:      Head: Normocephalic and atraumatic.      Nose: Nose normal.      Mouth/Throat:      Mouth: Mucous membranes are moist.     Eyes:      General: No scleral icterus.     Extraocular Movements: Extraocular movements intact.       Cardiovascular:      Rate and Rhythm: Normal rate and regular rhythm.      Heart sounds:      No gallop.      Comments: No edema.  Pulmonary:      Effort: Pulmonary effort is normal. No respiratory distress.      Breath sounds: No wheezing or rales.   Abdominal:      General: There is no distension.      Palpations: Abdomen is soft.      Tenderness: There is no abdominal tenderness.     Neurological:      Mental Status: He is alert and oriented to person, place, and time.     Psychiatric:         Mood and Affect: Mood normal.         Behavior: Behavior normal.                [1]   Past Medical History:  Diagnosis Date   • HIV (human immunodeficiency virus infection) (HCC)    [2] No past surgical history on file.  [3]   Family History  Problem Relation Name Age of Onset   • Hypertension Paternal Grandmother     [4] No Known Allergies  [5]   Current Outpatient Medications on File Prior to Visit   Medication Sig Dispense Refill   • bictegravir-emtricitab-tenofovir alafenamide (Biktarvy) -25 MG tablet Take 1 tablet by mouth daily with breakfast 90 tablet 1     No current " facility-administered medications on file prior to visit.   [6]   Social History  Tobacco Use   • Smoking status: Never   • Smokeless tobacco: Never   Vaping Use   • Vaping status: Never Used   Substance and Sexual Activity   • Alcohol use: Yes     Comment: Seldom   • Drug use: Never   • Sexual activity: Yes     Partners: Male

## 2025-06-16 NOTE — ASSESSMENT & PLAN NOTE
The patient had an increase in his creatinine in January of this year to 1.4 and then repeated recently at 1.3.  Prior to that it ran around 1.  Looking at multiple urinalyses there was no protein or blood that would not suggest an aggressive intrinsic process so that is good.  The only thing in his history that I think could contribute he is he actually says he started taking creatine supplements to exercise maybe about a year ago.  Creatine when it is metabolized generates creatinine so even though it is not present in every patient it is possible to raise creatinine levels.  If it is the case it is more on an increased production of creatinine etiology as opposed to worsening of kidney function.    I am going to have him stop his creatine supplement for 2 weeks then repeat lab work.  I will do a BMP to see if creatinine is lower and urine protein creatinine ratio to make sure there is no significant proteinuria measured.    I will then contact him with the results and determine if any follow-up or further testing is required and this was explained in detail to the patient he is aware.  No other changes.    Orders:    Basic metabolic panel    Protein / creatinine ratio, urine

## 2025-06-16 NOTE — PROGRESS NOTES
Name: Jony Aggarwal Jr.      : 1995      MRN: [id number]  Encounter Provider: Michael Baker MD  Encounter Date: 2025   [facility]  :  Assessment & Plan  Renal insufficiency    The patient had an increase in his creatinine in January of this year to 1.4 and then repeated recently at 1.3.  Prior to that it ran around 1.  Looking at multiple urinalyses there was no protein or blood that would not suggest an aggressive intrinsic process so that is good.  The only thing in his history that I think could contribute he is he actually says he started taking creatine supplements to exercise maybe about a year ago.  Creatine when it is metabolized generates creatinine so even though it is not present in every patient it is possible to raise creatinine levels.  If it is the case it is more on an increased production of creatinine etiology as opposed to worsening of kidney function.    I am going to have him stop his creatine supplement for 2 weeks then repeat lab work.  I will do a BMP to see if creatinine is lower and urine protein creatinine ratio to make sure there is no significant proteinuria measured.    I will then contact him with the results and determine if any follow-up or further testing is required and this was explained in detail to the patient he is aware.  No other changes.    Orders:    Basic metabolic panel    Protein / creatinine ratio, urine    I have spent a total time of 60 minutes in caring for this patient on the day of the visit/encounter including Diagnostic results, Instructions for management, Patient and family education, Reviewing/placing orders in the medical record (including tests, medications, and/or procedures), and Obtaining or reviewing history  .     History of Present Illness   HPI  Jony Aggarwal Jr. is a 29 y.o. male who presents for his first visit.  The patient is in good health he is currently on treatment for  "HIV and it is not active and has never had an opportunistic infection.  He is being monitored in infectious disease clinic and it was noticed that he had an elevated creatinine in January and then repeat value was elevated so was referred for evaluation and recommendations.  The patient had no complaints feels well.  History obtained from: patient    Review of Systems   Constitutional:  Negative for appetite change, chills and fever.   HENT: Negative.     Eyes: Negative.    Respiratory:  Negative for cough, chest tightness, shortness of breath and wheezing.    Cardiovascular:  Negative for chest pain and leg swelling.   Gastrointestinal:  Negative for abdominal pain, diarrhea, nausea and vomiting.   Genitourinary:  Negative for dysuria, flank pain and hematuria.   Neurological:  Negative for dizziness and headaches.   Psychiatric/Behavioral:  Negative for agitation, behavioral problems, confusion and decreased concentration.      Medical History Reviewed by provider this encounter:     .  Past Medical History   Past Medical History[1] No history of heart disease, kidney stone, kidney infection, liver disease, lung disease, hepatitis.  Past Surgical History[2]  Family History[3] No family history of kidney disease that he is aware of.   reports that he has never smoked. He has never used smokeless tobacco. He reports current alcohol use. He reports that he does not use drugs.  Current Outpatient Medications   Medication Instructions    bictegravir-emtricitab-tenofovir alafenamide (Biktarvy) -25 MG tablet 1 tablet, Oral, Daily with breakfast   Allergies[4]   Medications Ordered Prior to Encounter[5]   Social History[6] .  Denied tobacco ethanol or drug abuse.  Born in [place].     Objective   /82   Pulse 70   Ht 6' 2\" (1.88 m)   Wt 107 kg (235 lb)   BMI 30.17 kg/m²      Physical Exam  Constitutional:       General: He is not in acute distress.     Appearance: Normal appearance. He is not " ill-appearing or toxic-appearing.   HENT:      Head: Normocephalic and atraumatic.      Nose: Nose normal.      Mouth/Throat:      Mouth: Mucous membranes are moist.     Eyes:      General: No scleral icterus.     Extraocular Movements: Extraocular movements intact.       Cardiovascular:      Rate and Rhythm: Normal rate and regular rhythm.      Heart sounds:      No gallop.      Comments: No edema.  Pulmonary:      Effort: Pulmonary effort is normal. No respiratory distress.      Breath sounds: No wheezing or rales.   Abdominal:      General: There is no distension.      Palpations: Abdomen is soft.      Tenderness: There is no abdominal tenderness.     Neurological:      Mental Status: He is alert and oriented to person, place, and time.     Psychiatric:         Mood and Affect: Mood normal.         Behavior: Behavior normal.                [1]   Past Medical History:  Diagnosis Date    HIV (human immunodeficiency virus infection) ([facility])    [2] No past surgical history on file.  [3]   Family History  Problem Relation Name Age of Onset    Hypertension Paternal Grandmother     [4] No Known Allergies  [5]   Current Outpatient Medications on File Prior to Visit   Medication Sig Dispense Refill    bictegravir-emtricitab-tenofovir alafenamide (Biktarvy) -25 MG tablet Take 1 tablet by mouth daily with breakfast 90 tablet 1     No current facility-administered medications on file prior to visit.   [6]   Social History  Tobacco Use    Smoking status: Never    Smokeless tobacco: Never   Vaping Use    Vaping status: Never Used   Substance and Sexual Activity    Alcohol use: Yes     Comment: Seldom    Drug use: Never    Sexual activity: Yes     Partners: Male

## 2025-06-16 NOTE — PATIENT INSTRUCTIONS
You are here for your first visit manage to evaluate your kidney function.  The creatinine is the blood test for the kidney function and generally it ran around 1 which is normal and then it increased to 1.4 in January of this year and repeated at 1.3 so it went up slightly.  Urinalysis evaluation showed no protein or blood in the past and then only tells me that it is likely not an aggressive process because those cause protein in the urine.    You did start taking creatine as a supplement sometime prior to January of this year.  Creatine when it is metabolized kidney and increased production of creatinine so could be the possible cause why the blood test is a little elevated.  Creatine itself does not damage to the kidneys it is more a result of increased production of creatinine which can occasionally happen.    Stop the creatine supplement for couple weeks then get blood and urine test and will go to make sure things go back to your prior normal and also make sure there is no protein in your urine then my office will contact you with results.    Please feel free to contact me if you have any questions or concerns before you hear from the office.

## 2025-06-19 ENCOUNTER — PATIENT OUTREACH (OUTPATIENT)
Dept: SURGERY | Facility: CLINIC | Age: 30
End: 2025-06-19

## 2025-06-23 NOTE — PROGRESS NOTES
CM contacted the client to follow up on his medication adherence, and the client confirmed that he complies with all medical care plans.

## 2025-07-01 ENCOUNTER — PATIENT OUTREACH (OUTPATIENT)
Dept: SURGERY | Facility: CLINIC | Age: 30
End: 2025-07-01

## 2025-07-07 ENCOUNTER — APPOINTMENT (OUTPATIENT)
Dept: LAB | Facility: CLINIC | Age: 30
End: 2025-07-07
Payer: COMMERCIAL

## 2025-07-07 DIAGNOSIS — N18.2 STAGE 2 CHRONIC KIDNEY DISEASE: ICD-10-CM

## 2025-07-07 DIAGNOSIS — R80.9 PROTEINURIA, UNSPECIFIED TYPE: ICD-10-CM

## 2025-07-07 LAB
ANION GAP SERPL CALCULATED.3IONS-SCNC: 11 MMOL/L (ref 4–13)
BUN SERPL-MCNC: 13 MG/DL (ref 5–25)
CALCIUM SERPL-MCNC: 9.4 MG/DL (ref 8.4–10.2)
CHLORIDE SERPL-SCNC: 106 MMOL/L (ref 96–108)
CO2 SERPL-SCNC: 23 MMOL/L (ref 21–32)
CREAT SERPL-MCNC: 1.12 MG/DL (ref 0.6–1.3)
CREAT UR-MCNC: 32.3 MG/DL
GFR SERPL CREATININE-BSD FRML MDRD: 88 ML/MIN/1.73SQ M
GLUCOSE SERPL-MCNC: 150 MG/DL (ref 65–140)
POTASSIUM SERPL-SCNC: 3.6 MMOL/L (ref 3.5–5.3)
PROT UR-MCNC: <4 MG/DL
SODIUM SERPL-SCNC: 140 MMOL/L (ref 135–147)

## 2025-07-08 ENCOUNTER — RESULTS FOLLOW-UP (OUTPATIENT)
Dept: NEPHROLOGY | Facility: CLINIC | Age: 30
End: 2025-07-08

## 2025-07-08 NOTE — TELEPHONE ENCOUNTER
Called patient and left a voicemail stating the following information:    Patients labs NORMAL.  Creatinine improved and NO protein in urine so all GOOD.    I asked the patient please call back with further questions.

## 2025-07-08 NOTE — TELEPHONE ENCOUNTER
----- Message from Michael Baker MD sent at 7/8/2025 10:30 AM EDT -----  Let him know labs NORMAL.  Creatinine improved and NO protein in urine so all GOOD.  ----- Message -----  From: Lab, Background User  Sent: 7/7/2025   5:09 PM EDT  To: Michael Baker MD

## 2025-07-29 ENCOUNTER — PATIENT OUTREACH (OUTPATIENT)
Dept: SURGERY | Facility: CLINIC | Age: 30
End: 2025-07-29

## 2025-07-31 DIAGNOSIS — B20 CURRENTLY ASYMPTOMATIC HIV INFECTION, WITH HISTORY OF HIV-RELATED ILLNESS (HCC): ICD-10-CM

## 2025-07-31 RX ORDER — BICTEGRAVIR SODIUM, EMTRICITABINE, AND TENOFOVIR ALAFENAMIDE FUMARATE 50; 200; 25 MG/1; MG/1; MG/1
1 TABLET ORAL
Qty: 90 TABLET | Refills: 1 | Status: SHIPPED | OUTPATIENT
Start: 2025-07-31

## 2025-08-22 ENCOUNTER — PATIENT OUTREACH (OUTPATIENT)
Dept: SURGERY | Facility: CLINIC | Age: 30
End: 2025-08-22